# Patient Record
Sex: FEMALE | Race: WHITE | NOT HISPANIC OR LATINO | Employment: FULL TIME | URBAN - METROPOLITAN AREA
[De-identification: names, ages, dates, MRNs, and addresses within clinical notes are randomized per-mention and may not be internally consistent; named-entity substitution may affect disease eponyms.]

---

## 2017-02-21 ENCOUNTER — HOSPITAL ENCOUNTER (EMERGENCY)
Facility: MEDICAL CENTER | Age: 52
End: 2017-02-21
Attending: EMERGENCY MEDICINE
Payer: COMMERCIAL

## 2017-02-21 VITALS
HEIGHT: 65 IN | SYSTOLIC BLOOD PRESSURE: 138 MMHG | TEMPERATURE: 97.8 F | WEIGHT: 129.41 LBS | HEART RATE: 100 BPM | RESPIRATION RATE: 16 BRPM | DIASTOLIC BLOOD PRESSURE: 80 MMHG | BODY MASS INDEX: 21.56 KG/M2 | OXYGEN SATURATION: 99 %

## 2017-02-21 DIAGNOSIS — T50.905A MEDICATION SIDE EFFECT, INITIAL ENCOUNTER: ICD-10-CM

## 2017-02-21 DIAGNOSIS — R25.1 TREMOR: ICD-10-CM

## 2017-02-21 PROCEDURE — 99282 EMERGENCY DEPT VISIT SF MDM: CPT

## 2017-02-21 NOTE — ED NOTES
Pt given discharge instructions. Pt verbalized understanding. VSS no acute distress noted, pt able to ambulate without difficulty.

## 2017-02-21 NOTE — ED AVS SNAPSHOT
[employer] Access Code: [email or web address]  Expires: 3/10/2017  7:03 AM    [employer]  A secure, online tool to manage your health information     [employer]’s [employer]® is a secure, online tool that connects you to your personalized health information from the privacy of your home -- day or night - making it very easy for you to manage your healthcare. Once the activation process is completed, you can even access your medical information using the [employer] rick, which is available for free in the Apple Rick store or Google Play store.     [employer] provides the following levels of access (as shown below):   My Chart Features   [facility] Primary Care Doctor [facility]  Specialists [facility]  Urgent  Care Non-[facility]  Primary Care  Doctor   Email your healthcare team securely and privately 24/7 X X X X   Manage appointments: schedule your next appointment; view details of past/upcoming appointments X      Request prescription refills. X      View recent personal medical records, including lab and immunizations X X X X   View health record, including health history, allergies, medications X X X X   Read reports about your outpatient visits, procedures, consult and ER notes X X X X   See your discharge summary, which is a recap of your hospital and/or ER visit that includes your diagnosis, lab results, and care plan. X X       How to register for [employer]:  1. Go to  [email or web address].  2. Click on the Sign Up Now box, which takes you to the New Member Sign Up page. You will need to provide the following information:  a. Enter your [employer] Access Code exactly as it appears at the top of this page. (You will not need to use this code after you’ve completed the sign-up process. If you do not sign up before the expiration date, you must request a new code.)   b. Enter your date of birth.   c. Enter your home email address.   d. Click Submit, and follow the next screen’s instructions.  3. Create a [employer] ID. This will be your [employer]  login ID and cannot be changed, so think of one that is secure and easy to remember.  4. Create a [employer] password. You can change your password at any time.  5. Enter your Password Reset Question and Answer. This can be used at a later time if you forget your password.   6. Enter your e-mail address. This allows you to receive e-mail notifications when new information is available in [employer].  7. Click Sign Up. You can now view your health information.    For assistance activating your [employer] account, call [phone]

## 2017-02-21 NOTE — ED PROVIDER NOTES
ED Provider Note    Scribed for Harlan Davenport M.D. by Vamsi Rivas. 2/21/2017  7:45 AM    Primary care provider: Daniel Bennett M.D.  Means of arrival: Walk in   History obtained from: Patient  History limited by: None    CHIEF COMPLAINT  Chief Complaint   Patient presents with   • Tremors     since this morning       HPI  Lakshmi Tanner is a 51 y.o. female who presents to the Emergency Department complaining of tremors onset this morning. The patient reports she woke up with morning with right arm and bilateral leg tremors. She states she was placed on Paxil recently and is concerned that it is causing her tremors. She states normally drinks about 4 shots of Rum every night and has not made any recent changed to her alcohol consumption. She denies any pain, numbness, tingling, weakness. She denies any fever. She denies any pertinent past medical history.     REVIEW OF SYSTEMS  Pertinent positives include Tremors.   Pertinent negatives include no pain, numbness, tingling, weakness, fever.    E    PAST MEDICAL HISTORY   has a past medical history of Anemia.    SURGICAL HISTORY   has past surgical history that includes abdominal hysterectomy total; bladder sling female; and ankle orif.    SOCIAL HISTORY  Social History   Substance Use Topics   • Smoking status: Current Every Day Smoker -- 1.00 packs/day for 20 years     Types: Cigarettes   • Smokeless tobacco: Never Used   • Alcohol Use: No      History   Drug Use No       FAMILY HISTORY  Family History   Problem Relation Age of Onset   • Hypertension Father    • Cancer Paternal Aunt      breast   • Cancer Paternal Uncle      prostate   • Diabetes Neg Hx    • Heart Disease Neg Hx    • Hyperlipidemia Neg Hx    • Cancer Paternal Uncle      bladder       CURRENT MEDICATIONS  No current facility-administered medications for this encounter.    Current outpatient prescriptions:   •  Albuterol Sulfate 108 (90 BASE) MCG/ACT AEROSOL POWDER, BREATH ACTIVATED, Inhale  "1-2 Puffs by mouth every 6 hours as needed (cough, wheeze)., Disp: 1 Each, Rfl: 0  •  benzonatate (TESSALON) 100 MG Cap, Take 1 Cap by mouth 3 times a day as needed for Cough., Disp: 20 Cap, Rfl: 0  •  ciprofloxacin (CILOXIN) 0.3 % Solution, Place 1 gtt into affected eye[s] q3hrs (Patient not taking: Reported on 12/15/2016), Disp: 5 mL, Rfl: 0  •  erythromycin 5 MG/GM Ointment, Apply 1 ribbon of med inside lower eyelid to affected eye(s) qhs (Patient not taking: Reported on 12/15/2016), Disp: 1 Tube, Rfl: 0    ALLERGIES  No Known Allergies    PHYSICAL EXAM  VITAL SIGNS: /80 mmHg  Pulse 100  Temp(Src) 36.6 °C (97.8 °F)  Resp 16  Ht 1.651 m (5' 5\")  Wt 58.7 kg (129 lb 6.6 oz)  BMI 21.53 kg/m2  SpO2 99%    Nursing note and vitals reviewed.  Constitutional: Well-developed and well-nourished. No distress.   HENT: Head is normocephalic and atraumatic. Oropharynx is clear and moist without exudate or erythema.   Eyes: Pupils are equal, round, and reactive to light. Conjunctiva are normal.   Cardiovascular: Normal rate and regular rhythm. No murmur heard. Normal radial pulses.  Pulmonary/Chest: Breath sounds normal. No wheezes or rales.   Abdominal: Soft and non-tender. No distention    Musculoskeletal: Extremities exhibit normal range of motion without edema or tenderness.   Neurological: Awake, alert and oriented to person, place, and time. No focal deficits noted. No weakness. Slight tremor noted.   Skin: Skin is warm and dry. No rash.   Psychiatric: Normal mood and affect. Appropriate for clinical situation    DIAGNOSTIC STUDIES / PROCEDURES      RADIOLOGY  No orders to display     The radiologist's interpretation of all radiological studies have been reviewed by me.    COURSE & MEDICAL DECISION MAKING  Nursing notes, VS, PMSFHx reviewed in chart.     7:45 AM - Patient seen and examined at bedside. I discussed with patient, her signs and symptoms are most likely consistent with medication side effect. I " advised the patient to keep taking the medication and every day as prescribed until side effects subside after a week or so. I do not think this is a stroke. There is no focal deficits noted, weakness, or other neurological findings concerning stroke. Otherwise, if she is unable to tolerate, and if symptoms worsen to come back to the ED. I recommended not drinking alcohol with the medication. She will follow up with her primary care physician. Patient understood and agreed.     The patient is referred to a primary physician for blood pressure management, diabetic screening, and for all other preventative health concerns.    DISPOSITION:  Patient will be discharged home in stable condition.    FOLLOW UP:  [facility], Emergency Dept  [address] [id number] [phone]    If symptoms worsen    Daniel Bennett M.D.  [facility]  [facility] [id number] [phone]    Schedule an appointment as soon as possible for a visit        FINAL IMPRESSION  1. Tremor    2. Medication side effect, initial encounter          IVamsi (Elianaibpatrick), am scribing for, and in the presence of, Harlan Davenport M.D..    Electronically signed by: Vamsi Rivas (Vangie), 2/21/2017    IHarlan M.D. personally performed the services described in this documentation, as scribed by Vamsi Rivas in my presence, and it is both accurate and complete.    The note accurately reflects work and decisions made by me.  Harlan Davenport  2/21/2017  12:52 PM

## 2017-02-21 NOTE — ED AVS SNAPSHOT
2/21/2017          Lakshmi Tanner  [address] [place] [address]    Dear [place]:    [facility] wants to ensure your discharge home is safe and you or your loved ones have had all your questions answered regarding your care after you leave the hospital.    You may receive a telephone call within two days of your discharge.  This call is to make certain you understand your discharge instructions as well as ensure we provided you with the best care possible during your stay with us.     The call will only last approximately 3-5 minutes and will be done by a nurse.    Once again, we want to ensure your discharge home is safe and that you have a clear understanding of any next steps in your care.  If you have any questions or concerns, please do not hesitate to contact us, we are here for you.  Thank you for choosing [facility] for your healthcare needs.    Sincerely,    Alfred Boudreaux    [facility]

## 2017-02-21 NOTE — DISCHARGE INSTRUCTIONS
Paroxetine Controlled-Release Tablets  What is this medicine?  PAROXETINE (pa CANDIDO e teen) is used to treat depression. It may also be used to treat anxiety disorders, obsessive compulsive disorder, panic attacks, post traumatic stress, and premenstrual dysphoric disorder (PMDD).  This medicine may be used for other purposes; ask your health care provider or pharmacist if you have questions.  COMMON BRAND NAME(S): Paxil CR  What should I tell my health care provider before I take this medicine?  They need to know if you have any of these conditions:  -bleeding disorders  -glaucoma  -heart disease  -kidney disease  -liver disease  -low levels of sodium in the blood  -maty or bipolar disorder  -seizures  -suicidal thoughts, plans, or attempt; a previous suicide attempt by you or a family member  -take MAOIs like Carbex, Eldepryl, Marplan, Nardil, and Parnate  -take medicines that treat or prevent blood clots  -an unusual or allergic reaction to paroxetine, other medicines, foods, dyes, or preservatives  -pregnant or trying to get pregnant  -breast-feeding  How should I use this medicine?  Take this medicine by mouth with a glass of water. Follow the directions on the prescription label. You can take it with or without food. Do not crush or chew this medicine. Take your medicine at regular intervals. Do not take your medicine more often than directed. Do not stop taking this medicine suddenly except upon the advice of your doctor. Stopping this medicine too quickly may cause serious side effects or your condition may worsen.  A special MedGuide will be given to you by the pharmacist with each prescription and refill. Be sure to read this information carefully each time.  Talk to your pediatrician regarding the use of this medicine in children. Special care may be needed.  Overdosage: If you think you have taken too much of this medicine contact a poison control center or emergency room at once.  NOTE: This medicine is  only for you. Do not share this medicine with others.  What if I miss a dose?  If you miss a dose, take it as soon as you can. If it is almost time for your next dose, take only that dose. Do not take double or extra doses.  What may interact with this medicine?  Do not take this medicine with any of the following medications:  -linezolid  -MAOIs like Carbex, Eldepryl, Marplan, Nardil, and Parnate  -methylene blue (injected into a vein)  -pimozide  -thioridazine  This medicine may also interact with the following medications:  -alcohol  -antacids  -aspirin and aspirin-like medicines  -atomoxetine  -certain medicines for depression, anxiety, or psychotic disturbances  -certain medicines for irregular heart beat like propafenone, flecainide, encainide, and quinidine  -certain medicines for migraine headache like almotriptan, eletriptan, frovatriptan, naratriptan, rizatriptan, sumatriptan, zolmitriptan  -cimetidine  -digoxin  -diuretics  -fentanyl  -fosamprenavir/ritonavir  -furazolidone  -isoniazid  -lithium  -medicines that treat or prevent blood clots like warfarin, enoxaparin, and dalteparin  -medicines for sleep  -metoprolol  -NSAIDs, medicines for pain and inflammation, like ibuprofen or naproxen  -phenobarbital  -phenytoin  -procarbazine  -procyclidine  -rasagiline  -supplements like Wailua's wort, kava kava, valerian  -tamoxifen  -theophylline  -tramadol  -tryptophan  This list may not describe all possible interactions. Give your health care provider a list of all the medicines, herbs, non-prescription drugs, or dietary supplements you use. Also tell them if you smoke, drink alcohol, or use illegal drugs. Some items may interact with your medicine.  What should I watch for while using this medicine?  Tell your doctor if your symptoms do not get better or if they get worse. Visit your doctor or health care professional for regular checks on your progress. Because it may take several weeks to see the full  effects of this medicine, it is important to continue your treatment as prescribed by your doctor.  Patients and their families should watch out for new or worsening thoughts of suicide or depression. Also watch out for sudden changes in feelings such as feeling anxious, agitated, panicky, irritable, hostile, aggressive, impulsive, severely restless, overly excited and hyperactive, or not being able to sleep. If this happens, especially at the beginning of treatment or after a change in dose, call your health care professional.  You may get drowsy or dizzy. Do not drive, use machinery, or do anything that needs mental alertness until you know how this medicine affects you. Do not stand or sit up quickly, especially if you are an older patient. This reduces the risk of dizzy or fainting spells. Alcohol may interfere with the effect of this medicine. Avoid alcoholic drinks.  Your mouth may get dry. Chewing sugarless gum or sucking hard candy, and drinking plenty of water will help. Contact your doctor if the problem does not go away or is severe.  What side effects may I notice from receiving this medicine?  Side effects that you should report to your doctor or health care professional as soon as possible:  -allergic reactions like skin rash, itching or hives, swelling of the face, lips, or tongue  -black or bloody stools, blood in the urine or vomit  -fast, irregular heartbeat  -hallucination, loss of contact with reality  -painful or prolonged erection (men)  -seizures  -suicidal thoughts or other mood changes  -trouble passing urine or change in the amount of urine  -unusual bleeding or bruising  -unusually weak or tired  -vomiting  Side effects that usually do not require medical attention (report to your doctor or health care professional if they continue or are bothersome):  -change in appetite, weight  -change in sex drive or performance  -constipation or diarrhea  -difficulty  sleeping  -drowsy  -headache  -increased sweating  -muscle pain or weakness  -tremors  This list may not describe all possible side effects. Call your doctor for medical advice about side effects. You may report side effects to FDA at 5-110-RZC-9257.  Where should I keep my medicine?  Keep out of the reach of children.  Store at or below 25 degrees C (77 degrees F). Throw away any unused medicine after the expiration date.  NOTE: This sheet is a summary. It may not cover all possible information. If you have questions about this medicine, talk to your doctor, pharmacist, or health care provider.  © 2014, [employer]. (8/1/2013 5:51:56 PM)

## 2017-02-21 NOTE — ED NOTES
Pt ambulatory to triage c/o tremors to right arm and bilateral legs since this morning. Pt states that she was placed on Paxil last Friday and is concerned that it is causing her tremors. Pt states that she normally drink about 4 shots of Rum every night and has not made any recent changes to her alcohol consumption.

## 2017-02-21 NOTE — ED AVS SNAPSHOT
Home Care Instructions                                                                                                                Lakshmi Tanner   MRN: [id number]    Department:  [facility], Emergency Dept   Date of Visit:  2/21/2017            [facility], Emergency Dept    [address] [facility] [address]    Phone:  [phone]      You were seen by     Harlan Davenport M.D.      Your Diagnosis Was     Tremor     R25.1       Follow-up Information     1. Follow up with [facility], Emergency Dept.    Specialty:  Emergency Medicine    Why:  If symptoms worsen    Contact information    [address] [facility] [address] [phone]        2. Schedule an appointment as soon as possible for a visit with Daniel Bennett M.D..    Specialty:  Family Medicine    Contact information    [address] [place] [address] [address]  [facility] [address] [phone]        Medication Information     Review all of your home medications and newly ordered medications with your primary doctor and/or pharmacist as soon as possible. Follow medication instructions as directed by your doctor and/or pharmacist.     Please keep your complete medication list with you and share with your physician. Update the information when medications are discontinued, doses are changed, or new medications (including over-the-counter products) are added; and carry medication information at all times in the event of emergency situations.               Medication List      ASK your doctor about these medications        Instructions    Albuterol Sulfate 108 (90 BASE) MCG/ACT Aepb    Inhale 1-2 Puffs by mouth every 6 hours as needed (cough, wheeze).   Dose:  1-2 Puff       benzonatate 100 MG Caps   Commonly known as:  TESSALON    Take 1 Cap by mouth 3 times a day as needed for Cough.   Dose:  100 mg       ciprofloxacin 0.3 % Soln   Commonly known as:  CILOXIN    Place 1 gtt into affected  eye[s] q3hrs       erythromycin 5 MG/GM Oint    Apply 1 ribbon of med inside lower eyelid to affected eye(s) qhs                 Discharge Instructions       Paroxetine Controlled-Release Tablets  What is this medicine?  PAROXETINE (pa CANDIDO e teen) is used to treat depression. It may also be used to treat anxiety disorders, obsessive compulsive disorder, panic attacks, post traumatic stress, and premenstrual dysphoric disorder (PMDD).  This medicine may be used for other purposes; ask your health care provider or pharmacist if you have questions.  COMMON BRAND NAME(S): Paxil CR  What should I tell my health care provider before I take this medicine?  They need to know if you have any of these conditions:  -bleeding disorders  -glaucoma  -heart disease  -kidney disease  -liver disease  -low levels of sodium in the blood  -maty or bipolar disorder  -seizures  -suicidal thoughts, plans, or attempt; a previous suicide attempt by you or a family member  -take MAOIs like Carbex, Eldepryl, Marplan, Nardil, and Parnate  -take medicines that treat or prevent blood clots  -an unusual or allergic reaction to paroxetine, other medicines, foods, dyes, or preservatives  -pregnant or trying to get pregnant  -breast-feeding  How should I use this medicine?  Take this medicine by mouth with a glass of water. Follow the directions on the prescription label. You can take it with or without food. Do not crush or chew this medicine. Take your medicine at regular intervals. Do not take your medicine more often than directed. Do not stop taking this medicine suddenly except upon the advice of your doctor. Stopping this medicine too quickly may cause serious side effects or your condition may worsen.  A special MedGuide will be given to you by the pharmacist with each prescription and refill. Be sure to read this information carefully each time.  Talk to your pediatrician regarding the use of this medicine in children. Special care may be  needed.  Overdosage: If you think you have taken too much of this medicine contact a poison control center or emergency room at once.  NOTE: This medicine is only for you. Do not share this medicine with others.  What if I miss a dose?  If you miss a dose, take it as soon as you can. If it is almost time for your next dose, take only that dose. Do not take double or extra doses.  What may interact with this medicine?  Do not take this medicine with any of the following medications:  -linezolid  -MAOIs like Carbex, Eldepryl, Marplan, Nardil, and Parnate  -methylene blue (injected into a vein)  -pimozide  -thioridazine  This medicine may also interact with the following medications:  -alcohol  -antacids  -aspirin and aspirin-like medicines  -atomoxetine  -certain medicines for depression, anxiety, or psychotic disturbances  -certain medicines for irregular heart beat like propafenone, flecainide, encainide, and quinidine  -certain medicines for migraine headache like almotriptan, eletriptan, frovatriptan, naratriptan, rizatriptan, sumatriptan, zolmitriptan  -cimetidine  -digoxin  -diuretics  -fentanyl  -fosamprenavir/ritonavir  -furazolidone  -isoniazid  -lithium  -medicines that treat or prevent blood clots like warfarin, enoxaparin, and dalteparin  -medicines for sleep  -metoprolol  -NSAIDs, medicines for pain and inflammation, like ibuprofen or naproxen  -phenobarbital  -phenytoin  -procarbazine  -procyclidine  -rasagiline  -supplements like Jackie's wort, kava kava, valerian  -tamoxifen  -theophylline  -tramadol  -tryptophan  This list may not describe all possible interactions. Give your health care provider a list of all the medicines, herbs, non-prescription drugs, or dietary supplements you use. Also tell them if you smoke, drink alcohol, or use illegal drugs. Some items may interact with your medicine.  What should I watch for while using this medicine?  Tell your doctor if your symptoms do not get better  or if they get worse. Visit your doctor or health care professional for regular checks on your progress. Because it may take several weeks to see the full effects of this medicine, it is important to continue your treatment as prescribed by your doctor.  Patients and their families should watch out for new or worsening thoughts of suicide or depression. Also watch out for sudden changes in feelings such as feeling anxious, agitated, panicky, irritable, hostile, aggressive, impulsive, severely restless, overly excited and hyperactive, or not being able to sleep. If this happens, especially at the beginning of treatment or after a change in dose, call your health care professional.  You may get drowsy or dizzy. Do not drive, use machinery, or do anything that needs mental alertness until you know how this medicine affects you. Do not stand or sit up quickly, especially if you are an older patient. This reduces the risk of dizzy or fainting spells. Alcohol may interfere with the effect of this medicine. Avoid alcoholic drinks.  Your mouth may get dry. Chewing sugarless gum or sucking hard candy, and drinking plenty of water will help. Contact your doctor if the problem does not go away or is severe.  What side effects may I notice from receiving this medicine?  Side effects that you should report to your doctor or health care professional as soon as possible:  -allergic reactions like skin rash, itching or hives, swelling of the face, lips, or tongue  -black or bloody stools, blood in the urine or vomit  -fast, irregular heartbeat  -hallucination, loss of contact with reality  -painful or prolonged erection (men)  -seizures  -suicidal thoughts or other mood changes  -trouble passing urine or change in the amount of urine  -unusual bleeding or bruising  -unusually weak or tired  -vomiting  Side effects that usually do not require medical attention (report to your doctor or health care professional if they continue or  are bothersome):  -change in appetite, weight  -change in sex drive or performance  -constipation or diarrhea  -difficulty sleeping  -drowsy  -headache  -increased sweating  -muscle pain or weakness  -tremors  This list may not describe all possible side effects. Call your doctor for medical advice about side effects. You may report side effects to FDA at 6-379-BKN-5715.  Where should I keep my medicine?  Keep out of the reach of children.  Store at or below 25 degrees C (77 degrees F). Throw away any unused medicine after the expiration date.  NOTE: This sheet is a summary. It may not cover all possible information. If you have questions about this medicine, talk to your doctor, pharmacist, or health care provider.  © 2014, [employer]/Gold Standard. (8/1/2013 5:51:56 PM)            Patient Information     Patient Information    Following emergency treatment: all patient requiring follow-up care must return either to a private physician or a clinic if your condition worsens before you are able to obtain further medical attention, please return to the emergency room.     Billing Information    At [facility], we work to make the billing process streamlined for our patients.  Our Representatives are here to answer any questions you may have regarding your hospital bill.  If you have insurance coverage and have supplied your insurance information to us, we will submit a claim to your insurer on your behalf.  Should you have any questions regarding your bill, we can be reached online or by phone as follows:  Online: You are able pay your bills online or live chat with our representatives about any billing questions you may have. We are here to help Monday - Friday from 8:00am to 7:30pm and 9:00am - 12:00pm on Saturdays.  Please visit [email or web address]  for more information.   Phone:  [phone] or [phone]    Please note that your emergency physician, surgeon, pathologist,  radiologist, anesthesiologist, and other specialists are not employed by [facility] and will therefore bill separately for their services.  Please contact them directly for any questions concerning their bills at the numbers below:     Emergency Physician Services:  [phone]  [facility]:  [phone]  [facility]:  [phone]  [facility]:  [phone]    1. Your final bill may vary from the amount quoted upon discharge if all procedures are not complete at that time, or if your doctor has additional procedures of which we are not aware. You will receive an additional bill if you return to the Emergency Department at [facility] for suture removal regardless of the facility of which the sutures were placed.     2. Please arrange for settlement of this account at the emergency registration.    3. All self-pay accounts are due in full at the time of treatment.  If you are unable to meet this obligation then payment is expected within 4-5 days.     4. If you have had radiology studies (CT, X-ray, Ultrasound, MRI), you have received a preliminary result during your emergency department visit. Please contact the radiology department [phone] to receive a copy of your final result. Please discuss the Final result with your primary physician or with the follow up physician provided.     Crisis Hotline:  [place] Crisis Hotline:  6-656-SLQPTLB or 1-666.110.6116  [place] Crisis Hotline:    1-733.567.4464 or 915-582-3811         ED Discharge Follow Up Questions    1. In order to provide you with very good care, we would like to follow up with a phone call in the next few days.  May we have your permission to contact you?     YES /  NO    2. What is the best phone number to call you? (       )_____-__________    3. What is the best time to call you?      Morning  /  Afternoon  /  Evening                   Patient Signature:   ____________________________________________________________    Date:  ____________________________________________________________      Your appointments     Mar 09, 2017  1:40 PM   [facility] [place] [facility]    [address] [place] [address], [address]  [facility] [id number] [phone]           You will be receiving a confirmation call a few days before your appointment from our automated call confirmation system.

## 2017-09-18 ENCOUNTER — OFFICE VISIT (OUTPATIENT)
Dept: URGENT CARE | Facility: CLINIC | Age: 52
End: 2017-09-18
Payer: COMMERCIAL

## 2017-09-18 ENCOUNTER — APPOINTMENT (OUTPATIENT)
Dept: RADIOLOGY | Facility: MEDICAL CENTER | Age: 52
End: 2017-09-18
Attending: EMERGENCY MEDICINE
Payer: COMMERCIAL

## 2017-09-18 ENCOUNTER — HOSPITAL ENCOUNTER (OUTPATIENT)
Facility: MEDICAL CENTER | Age: 52
End: 2017-09-19
Attending: EMERGENCY MEDICINE | Admitting: HOSPITALIST
Payer: COMMERCIAL

## 2017-09-18 ENCOUNTER — RESOLUTE PROFESSIONAL BILLING HOSPITAL PROF FEE (OUTPATIENT)
Dept: HOSPITALIST | Facility: MEDICAL CENTER | Age: 52
End: 2017-09-18
Payer: COMMERCIAL

## 2017-09-18 ENCOUNTER — APPOINTMENT (OUTPATIENT)
Dept: RADIOLOGY | Facility: MEDICAL CENTER | Age: 52
End: 2017-09-18
Attending: HOSPITALIST
Payer: COMMERCIAL

## 2017-09-18 VITALS
HEIGHT: 65 IN | OXYGEN SATURATION: 96 % | TEMPERATURE: 97.9 F | HEART RATE: 89 BPM | DIASTOLIC BLOOD PRESSURE: 72 MMHG | BODY MASS INDEX: 22.33 KG/M2 | RESPIRATION RATE: 14 BRPM | SYSTOLIC BLOOD PRESSURE: 126 MMHG | WEIGHT: 134 LBS

## 2017-09-18 DIAGNOSIS — R20.2 PARESTHESIA: ICD-10-CM

## 2017-09-18 DIAGNOSIS — R20.2 NUMBNESS AND TINGLING OF RIGHT UPPER AND LOWER EXTREMITY: ICD-10-CM

## 2017-09-18 DIAGNOSIS — R20.0 NUMBNESS AND TINGLING OF RIGHT UPPER AND LOWER EXTREMITY: ICD-10-CM

## 2017-09-18 DIAGNOSIS — N30.00 ACUTE CYSTITIS WITHOUT HEMATURIA: ICD-10-CM

## 2017-09-18 PROBLEM — G81.91 RIGHT HEMIPARESIS (HCC): Status: ACTIVE | Noted: 2017-09-18

## 2017-09-18 LAB
ALBUMIN SERPL BCP-MCNC: 4.6 G/DL (ref 3.2–4.9)
ALBUMIN/GLOB SERPL: 1.5 G/DL
ALP SERPL-CCNC: 46 U/L (ref 30–99)
ALT SERPL-CCNC: 22 U/L (ref 2–50)
ANION GAP SERPL CALC-SCNC: 9 MMOL/L (ref 0–11.9)
APPEARANCE UR: CLEAR
APTT PPP: 26.2 SEC (ref 24.7–36)
AST SERPL-CCNC: 32 U/L (ref 12–45)
BACTERIA #/AREA URNS HPF: ABNORMAL /HPF
BASOPHILS # BLD AUTO: 1.8 % (ref 0–1.8)
BASOPHILS # BLD: 0.09 K/UL (ref 0–0.12)
BILIRUB SERPL-MCNC: 0.4 MG/DL (ref 0.1–1.5)
BILIRUB UR QL STRIP.AUTO: NEGATIVE
BUN SERPL-MCNC: 7 MG/DL (ref 8–22)
CALCIUM SERPL-MCNC: 9.7 MG/DL (ref 8.5–10.5)
CHLORIDE SERPL-SCNC: 105 MMOL/L (ref 96–112)
CO2 SERPL-SCNC: 24 MMOL/L (ref 20–33)
COLOR UR: YELLOW
CREAT SERPL-MCNC: 0.79 MG/DL (ref 0.5–1.4)
CULTURE IF INDICATED INDCX: YES UA CULTURE
EKG IMPRESSION: NORMAL
EOSINOPHIL # BLD AUTO: 0.11 K/UL (ref 0–0.51)
EOSINOPHIL NFR BLD: 2.2 % (ref 0–6.9)
EPI CELLS #/AREA URNS HPF: ABNORMAL /HPF
ERYTHROCYTE [DISTWIDTH] IN BLOOD BY AUTOMATED COUNT: 46.8 FL (ref 35.9–50)
EST. AVERAGE GLUCOSE BLD GHB EST-MCNC: 105 MG/DL
GFR SERPL CREATININE-BSD FRML MDRD: >60 ML/MIN/1.73 M 2
GLOBULIN SER CALC-MCNC: 3 G/DL (ref 1.9–3.5)
GLUCOSE SERPL-MCNC: 73 MG/DL (ref 65–99)
GLUCOSE UR STRIP.AUTO-MCNC: NEGATIVE MG/DL
HBA1C MFR BLD: 5.3 % (ref 0–5.6)
HCT VFR BLD AUTO: 46.5 % (ref 37–47)
HGB BLD-MCNC: 16.6 G/DL (ref 12–16)
HYALINE CASTS #/AREA URNS LPF: ABNORMAL /LPF
IMM GRANULOCYTES # BLD AUTO: 0.01 K/UL (ref 0–0.11)
IMM GRANULOCYTES NFR BLD AUTO: 0.2 % (ref 0–0.9)
INR PPP: 0.85 (ref 0.87–1.13)
KETONES UR STRIP.AUTO-MCNC: NEGATIVE MG/DL
LEUKOCYTE ESTERASE UR QL STRIP.AUTO: NEGATIVE
LV EJECT FRACT  99904: 70
LV EJECT FRACT MOD 2C 99903: 80.31
LV EJECT FRACT MOD 4C 99902: 70.34
LV EJECT FRACT MOD BP 99901: 73.73
LYMPHOCYTES # BLD AUTO: 1.76 K/UL (ref 1–4.8)
LYMPHOCYTES NFR BLD: 35.3 % (ref 22–41)
MCH RBC QN AUTO: 34 PG (ref 27–33)
MCHC RBC AUTO-ENTMCNC: 35.7 G/DL (ref 33.6–35)
MCV RBC AUTO: 95.3 FL (ref 81.4–97.8)
MICRO URNS: ABNORMAL
MONOCYTES # BLD AUTO: 0.32 K/UL (ref 0–0.85)
MONOCYTES NFR BLD AUTO: 6.4 % (ref 0–13.4)
NEUTROPHILS # BLD AUTO: 2.69 K/UL (ref 2–7.15)
NEUTROPHILS NFR BLD: 54.1 % (ref 44–72)
NITRITE UR QL STRIP.AUTO: POSITIVE
NRBC # BLD AUTO: 0 K/UL
NRBC BLD AUTO-RTO: 0 /100 WBC
PH UR STRIP.AUTO: 5 [PH]
PLATELET # BLD AUTO: 164 K/UL (ref 164–446)
PMV BLD AUTO: 10 FL (ref 9–12.9)
POTASSIUM SERPL-SCNC: 4.4 MMOL/L (ref 3.6–5.5)
PROT SERPL-MCNC: 7.6 G/DL (ref 6–8.2)
PROT UR QL STRIP: NEGATIVE MG/DL
PROTHROMBIN TIME: 11.9 SEC (ref 12–14.6)
RBC # BLD AUTO: 4.88 M/UL (ref 4.2–5.4)
RBC # URNS HPF: ABNORMAL /HPF
RBC UR QL AUTO: NEGATIVE
SODIUM SERPL-SCNC: 138 MMOL/L (ref 135–145)
SP GR UR STRIP.AUTO: 1
TSH SERPL DL<=0.005 MIU/L-ACNC: 2.75 UIU/ML (ref 0.3–3.7)
UROBILINOGEN UR STRIP.AUTO-MCNC: 0.2 MG/DL
WBC # BLD AUTO: 5 K/UL (ref 4.8–10.8)
WBC #/AREA URNS HPF: ABNORMAL /HPF

## 2017-09-18 PROCEDURE — 700111 HCHG RX REV CODE 636 W/ 250 OVERRIDE (IP): Performed by: EMERGENCY MEDICINE

## 2017-09-18 PROCEDURE — 99214 OFFICE O/P EST MOD 30 MIN: CPT | Performed by: NURSE PRACTITIONER

## 2017-09-18 PROCEDURE — 85610 PROTHROMBIN TIME: CPT

## 2017-09-18 PROCEDURE — 96375 TX/PRO/DX INJ NEW DRUG ADDON: CPT

## 2017-09-18 PROCEDURE — 93880 EXTRACRANIAL BILAT STUDY: CPT

## 2017-09-18 PROCEDURE — 84443 ASSAY THYROID STIM HORMONE: CPT

## 2017-09-18 PROCEDURE — 81001 URINALYSIS AUTO W/SCOPE: CPT

## 2017-09-18 PROCEDURE — 87086 URINE CULTURE/COLONY COUNT: CPT

## 2017-09-18 PROCEDURE — 93880 EXTRACRANIAL BILAT STUDY: CPT | Mod: 26 | Performed by: SURGERY

## 2017-09-18 PROCEDURE — 99220 PR INITIAL OBSERVATION CARE,LEVL III: CPT | Performed by: HOSPITALIST

## 2017-09-18 PROCEDURE — 93306 TTE W/DOPPLER COMPLETE: CPT

## 2017-09-18 PROCEDURE — 93005 ELECTROCARDIOGRAM TRACING: CPT | Performed by: EMERGENCY MEDICINE

## 2017-09-18 PROCEDURE — 80053 COMPREHEN METABOLIC PANEL: CPT

## 2017-09-18 PROCEDURE — 85730 THROMBOPLASTIN TIME PARTIAL: CPT

## 2017-09-18 PROCEDURE — 70551 MRI BRAIN STEM W/O DYE: CPT

## 2017-09-18 PROCEDURE — 71010 DX-CHEST-PORTABLE (1 VIEW): CPT

## 2017-09-18 PROCEDURE — G0378 HOSPITAL OBSERVATION PER HR: HCPCS

## 2017-09-18 PROCEDURE — 93005 ELECTROCARDIOGRAM TRACING: CPT

## 2017-09-18 PROCEDURE — 700102 HCHG RX REV CODE 250 W/ 637 OVERRIDE(OP): Performed by: HOSPITALIST

## 2017-09-18 PROCEDURE — 96374 THER/PROPH/DIAG INJ IV PUSH: CPT

## 2017-09-18 PROCEDURE — A9270 NON-COVERED ITEM OR SERVICE: HCPCS | Performed by: HOSPITALIST

## 2017-09-18 PROCEDURE — 85025 COMPLETE CBC W/AUTO DIFF WBC: CPT

## 2017-09-18 PROCEDURE — 36415 COLL VENOUS BLD VENIPUNCTURE: CPT

## 2017-09-18 PROCEDURE — 306484 SLEEVE,VASO THIGH MED: Performed by: HOSPITALIST

## 2017-09-18 PROCEDURE — 83036 HEMOGLOBIN GLYCOSYLATED A1C: CPT

## 2017-09-18 PROCEDURE — 99285 EMERGENCY DEPT VISIT HI MDM: CPT

## 2017-09-18 PROCEDURE — 87077 CULTURE AEROBIC IDENTIFY: CPT

## 2017-09-18 PROCEDURE — 93306 TTE W/DOPPLER COMPLETE: CPT | Mod: 26 | Performed by: INTERNAL MEDICINE

## 2017-09-18 PROCEDURE — 87186 SC STD MICRODIL/AGAR DIL: CPT

## 2017-09-18 PROCEDURE — 70450 CT HEAD/BRAIN W/O DYE: CPT

## 2017-09-18 RX ORDER — ASPIRIN 81 MG/1
324 TABLET, CHEWABLE ORAL DAILY
Status: DISCONTINUED | OUTPATIENT
Start: 2017-09-18 | End: 2017-09-19 | Stop reason: HOSPADM

## 2017-09-18 RX ORDER — ACETAMINOPHEN 325 MG/1
650 TABLET ORAL EVERY 6 HOURS PRN
Status: DISCONTINUED | OUTPATIENT
Start: 2017-09-18 | End: 2017-09-19 | Stop reason: HOSPADM

## 2017-09-18 RX ORDER — CEFTRIAXONE 1 G/1
1 INJECTION, POWDER, FOR SOLUTION INTRAMUSCULAR; INTRAVENOUS ONCE
Status: COMPLETED | OUTPATIENT
Start: 2017-09-18 | End: 2017-09-18

## 2017-09-18 RX ORDER — TEMAZEPAM 15 MG/1
15 CAPSULE ORAL
Status: DISCONTINUED | OUTPATIENT
Start: 2017-09-18 | End: 2017-09-19 | Stop reason: HOSPADM

## 2017-09-18 RX ORDER — ONDANSETRON 2 MG/ML
4 INJECTION INTRAMUSCULAR; INTRAVENOUS EVERY 4 HOURS PRN
Status: DISCONTINUED | OUTPATIENT
Start: 2017-09-18 | End: 2017-09-19 | Stop reason: HOSPADM

## 2017-09-18 RX ORDER — BISACODYL 10 MG
10 SUPPOSITORY, RECTAL RECTAL
Status: DISCONTINUED | OUTPATIENT
Start: 2017-09-18 | End: 2017-09-19 | Stop reason: HOSPADM

## 2017-09-18 RX ORDER — ASPIRIN 325 MG
325 TABLET ORAL DAILY
Status: DISCONTINUED | OUTPATIENT
Start: 2017-09-18 | End: 2017-09-19 | Stop reason: HOSPADM

## 2017-09-18 RX ORDER — PROMETHAZINE HYDROCHLORIDE 25 MG/1
12.5-25 SUPPOSITORY RECTAL EVERY 4 HOURS PRN
Status: DISCONTINUED | OUTPATIENT
Start: 2017-09-18 | End: 2017-09-19 | Stop reason: HOSPADM

## 2017-09-18 RX ORDER — AMOXICILLIN 250 MG
2 CAPSULE ORAL 2 TIMES DAILY
Status: DISCONTINUED | OUTPATIENT
Start: 2017-09-19 | End: 2017-09-19 | Stop reason: HOSPADM

## 2017-09-18 RX ORDER — LABETALOL HYDROCHLORIDE 5 MG/ML
10 INJECTION, SOLUTION INTRAVENOUS EVERY 4 HOURS PRN
Status: DISCONTINUED | OUTPATIENT
Start: 2017-09-18 | End: 2017-09-19 | Stop reason: HOSPADM

## 2017-09-18 RX ORDER — PROMETHAZINE HYDROCHLORIDE 25 MG/1
12.5-25 TABLET ORAL EVERY 4 HOURS PRN
Status: DISCONTINUED | OUTPATIENT
Start: 2017-09-18 | End: 2017-09-19 | Stop reason: HOSPADM

## 2017-09-18 RX ORDER — ONDANSETRON 4 MG/1
4 TABLET, ORALLY DISINTEGRATING ORAL EVERY 4 HOURS PRN
Status: DISCONTINUED | OUTPATIENT
Start: 2017-09-18 | End: 2017-09-19 | Stop reason: HOSPADM

## 2017-09-18 RX ORDER — ASPIRIN 300 MG/1
300 SUPPOSITORY RECTAL DAILY
Status: DISCONTINUED | OUTPATIENT
Start: 2017-09-18 | End: 2017-09-19 | Stop reason: HOSPADM

## 2017-09-18 RX ORDER — ATORVASTATIN CALCIUM 80 MG/1
80 TABLET, FILM COATED ORAL EVERY EVENING
Status: DISCONTINUED | OUTPATIENT
Start: 2017-09-18 | End: 2017-09-19 | Stop reason: HOSPADM

## 2017-09-18 RX ORDER — POLYETHYLENE GLYCOL 3350 17 G/17G
1 POWDER, FOR SOLUTION ORAL
Status: DISCONTINUED | OUTPATIENT
Start: 2017-09-18 | End: 2017-09-19 | Stop reason: HOSPADM

## 2017-09-18 RX ADMIN — CEFTRIAXONE SODIUM 1 G: 1 INJECTION, POWDER, FOR SOLUTION INTRAMUSCULAR; INTRAVENOUS at 11:42

## 2017-09-18 RX ADMIN — ATORVASTATIN CALCIUM 80 MG: 80 TABLET, FILM COATED ORAL at 21:23

## 2017-09-18 ASSESSMENT — LIFESTYLE VARIABLES
CONSUMPTION TOTAL: POSITIVE
EVER FELT BAD OR GUILTY ABOUT YOUR DRINKING: NO
TOTAL SCORE: 0
EVER HAD A DRINK FIRST THING IN THE MORNING TO STEADY YOUR NERVES TO GET RID OF A HANGOVER: NO
HOW MANY TIMES IN THE PAST YEAR HAVE YOU HAD 5 OR MORE DRINKS IN A DAY: 1
ON A TYPICAL DAY WHEN YOU DRINK ALCOHOL HOW MANY DRINKS DO YOU HAVE: 1
TOTAL SCORE: 0
HAVE PEOPLE ANNOYED YOU BY CRITICIZING YOUR DRINKING: NO
AVERAGE NUMBER OF DAYS PER WEEK YOU HAVE A DRINK CONTAINING ALCOHOL: 2
HAVE YOU EVER FELT YOU SHOULD CUT DOWN ON YOUR DRINKING: NO
EVER_SMOKED: YES
TOTAL SCORE: 0
DO YOU DRINK ALCOHOL: YES

## 2017-09-18 ASSESSMENT — COPD QUESTIONNAIRES
DO YOU EVER COUGH UP ANY MUCUS OR PHLEGM?: NO/ONLY WITH OCCASIONAL COLDS OR INFECTIONS
HAVE YOU SMOKED AT LEAST 100 CIGARETTES IN YOUR ENTIRE LIFE: YES
DURING THE PAST 4 WEEKS HOW MUCH DID YOU FEEL SHORT OF BREATH: NONE/LITTLE OF THE TIME
COPD SCREENING SCORE: 3

## 2017-09-18 ASSESSMENT — PATIENT HEALTH QUESTIONNAIRE - PHQ9
1. LITTLE INTEREST OR PLEASURE IN DOING THINGS: NOT AT ALL
SUM OF ALL RESPONSES TO PHQ QUESTIONS 1-9: 0
SUM OF ALL RESPONSES TO PHQ9 QUESTIONS 1 AND 2: 0
2. FEELING DOWN, DEPRESSED, IRRITABLE, OR HOPELESS: NOT AT ALL

## 2017-09-18 ASSESSMENT — PAIN SCALES - GENERAL
PAINLEVEL_OUTOF10: 0

## 2017-09-18 NOTE — H&P
PRIMARY CARE:  None.    CHIEF COMPLAINT:  Right arm and right leg numbness and weakness.    HISTORY OF PRESENT ILLNESS:  This is a 52-year-old female who presents with   the above symptoms.  She reports that she woke this morning and was feeling   fine.  While she was driving to work, she started to notice some heaviness and   numbness in her right arm and right leg.  When she got out to drive to work   she could do it, but she felt like the right side of her body was just not   working like it is supposed to.  She did not notice any problems with her   speech, there was no facial droop that she is aware of or any co-worker is   aware of.  She did notice some difficulty with coordination on that side.    Patient came to the emergency room and was evaluated.  Her NIH score was too   low to make her TPA candidate, though she did present within the window.    Since the time of her onset of symptoms, she reports that she is now doing   much better.  She states her symptoms are not entirely back to normal;   however, she thinks that they are much better than they were at onset.  She   continues to have a little bit of numbness and she thinks may be a little bit   of weakness in the right side generally.    REVIEW OF SYSTEMS:  Positive as noted above, otherwise all systems are   reviewed and negative.    PREVIOUS MEDICAL HISTORY:  Patient denies any previous medical history.    MEDICATIONS:  The patient states she takes no prescribed medications.    ALLERGIES:  No known drug allergies.    SOCIAL HISTORY:  Patient smokes about a pack and a half and she has been doing   that for most of her life.  She states she will drink 2-3 times a week,   usually when her daughter comes over to visit.  She works at a [employer].    SURGICAL HISTORY:  1.  Total abdominal hysterectomy.  2.  Bladder sling.  3.  Ankle ORIF.    FAMILY HISTORY:  Father with atrial fibrillation, she states otherwise no   significant family  history.    PHYSICAL EXAMINATION:  VITAL SIGNS:  Temperature 97.1, heart rate 68, respiratory rate 16, /78,   satting 99% on room air.  GENERAL:  The patient is awake, alert.  She is in no acute distress.  HEENT:  Head is normocephalic and atraumatic.  Mucous membranes are moist.    Sclerae and conjunctivae benign.  NECK:  Trachea is midline.  There is no JVD or bruits.  Neck is supple.  RESPIRATORY:  Clear to auscultation bilaterally.  CARDIAC:  Regular rate and rhythm, sinus on the monitor.  No murmurs, rubs, or   clicks.  ABDOMEN:  Soft.  No guarding, rebound, hepatosplenomegaly, or masses.    Nontender to palpation.  EXTREMITIES:  No clubbing, cyanosis, or edema.  Peripheral pulses are   maintained.  Calves are nontender to palpation.  SKIN:  Warm and dry.  No rashes are appreciated.  NEUROLOGIC:  The patient is alert and oriented.  Her speech is clear and   content is logical.  Gait is normal.  Romberg is negative.  Finger-to-nose and   heel-to-shin are normal and symmetric.  Sensation is intact throughout.    Cranial nerves II through XII are intact.  Strength is symmetric bilaterally,   which I would interpret to be slightly diminished on her dominant right side.    LABORATORY DATA:  White count 5.0, hemoglobin 16.6, platelet count 164.    Sodium 138, potassium 4.4, BUN 7, creatinine 0.79.  LFTs are unremarkable.    INR 0.85.  UA:  Nitrite positive, leukocyte esterase negative.    IMAGING STUDIES:  1.  CT of the head is interpreted by radiology and demonstrates no acute   findings.  2.  Portable chest x-ray reviewed by myself.  Slightly hyperinflated,   otherwise unremarkable.  3.  EKG is reviewed.  There is no comparison study.  It demonstrates a sinus   rhythm with a rate of 84.  ST segments are isoelectric throughout.  T-waves   are unremarkable with exception of slight inversion in aVL.  There are no   significant Q-waves.  Intervals are benign as are voltages.  Impression:    Unremarkable  EKG.    ASSESSMENT AND PLAN:  A 52-year-old female with problem list as follows:  1.  Right hemiparesis:  Initial workup is negative, but presentation is   concerning for cerebrovascular accident.  Patient will be admitted to the   neuro floor where we will continue workup with cardiac echo, telemetry   monitoring, carotid ultrasounds, and MRI of the brain.  We will check lipids   and maintain her on aspirin as she was on no antiplatelet therapy prior to   this.  I have encouraged her to quit smoking.  2.  Urinary tract infection:  We will treat with Rocephin.  This is seemingly   uncomplicated UTI.  Therefore, 3 days should be sufficient.  3.  Tobacco abuse:  I have counseled cessation.    It is anticipated the patient will likely be able to go home in less than 2   midnights assuming that her initial stroke workup is relatively unremarkable.    Treatment plan has been reviewed with the patient at length and all questions   have been answered.       ____________________________________     DO MANISHA Hodges / PAOLA    DD:  09/18/2017 12:09:13  DT:  09/18/2017 13:07:21    D#:  [id number]  Job#:  [id number]

## 2017-09-18 NOTE — ED PROVIDER NOTES
ED Provider Note    CHIEF COMPLAINT  Chief Complaint   Patient presents with   • Numbness     R arm and leg - started today       HPI  Lakshmi Tanner is a 52 y.o. female who presents to the emergency room with right arm and right leg numbness and weakness. Symptoms started today at about 7 AM when she was driving to work. She noticed that she was having a hard time stepping on the gas pedal with her right foot. She felt tingling in her entire right leg and in her right arm. She denies having associated headache. No slurred speech or confusion. No poor coordination. Her symptoms have improved and she no longer feels any weakness. She still feels some tingling in the right leg. This is complicated because of a past history of neuropathy. Etiology of her neuropathy is not clear. She's not had a fever or chills. No chest pain shortness of breath abdominal pain.    REVIEW OF SYSTEMS  As per HPI All other systems reviewed and are negative.     PAST MEDICAL HISTORY  Anemia, neuropathy    FAMILY HISTORY  Family History   Problem Relation Age of Onset   • Hypertension Father    • Cancer Paternal Aunt      breast   • Cancer Paternal Uncle      prostate   • Diabetes Neg Hx    • Heart Disease Neg Hx    • Hyperlipidemia Neg Hx    • Cancer Paternal Uncle      bladder       SOCIAL HISTORY  Social History   Substance Use Topics   • Smoking status: Current Every Day Smoker     Packs/day: 1.00     Years: 20.00     Types: Cigarettes   • Smokeless tobacco: Never Used   • Alcohol use No       SURGICAL HISTORY  Past Surgical History:   Procedure Laterality Date   • ABDOMINAL HYSTERECTOMY TOTAL     • ANKLE ORIF     • BLADDER SLING FEMALE         CURRENT MEDICATIONS  Home Medications     Reviewed by Edna Nielson (Pharmacy Tech) on 09/18/17 at 1041  Med List Status: Complete   Medication Last Dose Status        Patient Pancho Taking any Medications                       ALLERGIES  No Known Allergies    PHYSICAL EXAM  VITAL  "SIGNS: /78   Pulse 68   Temp 37.1 °C (98.7 °F)   Resp 16   Ht 1.651 m (5' 5\")   Wt 60 kg (132 lb 4.4 oz)   SpO2 95%   BMI 22.01 kg/m²   Constitutional: Awake and alert  HENT: Head atraumatic, ears normal inspection, oropharynx is benign with moist mucous membranes, nares clear  Eyes: Pupils equal round reactive, extraocular muscles are intact. No ptosis or miosis.  Neck: Neck is supple without meningismus. No JVD. No pain. No bruit is noted  Cardiovascular: Normal heart rate, Normal rhythm  Thorax & Lungs: Normal breath sounds, No respiratory distress, No wheezing, No chest tenderness.   Abdomen: Soft, No tenderness, No masses, No pulsatile masses.   Skin: No pathologic rash  Extremities: Intact distal pulses, No edema, No tenderness, No cyanosis, No clubbing.   Neurologic: Sensory disturbance in bilateral hands and bilateral feet. This is unchanged from baseline aside from possibly some increased tingling in the right leg diffusely. Sensory is intact, but abnormal. Her cranial nerves are intact. Speech clear. Awake and alert. Motor 5/5 throughout. Normal sensory. Normal extraocular muscles. No visual field cut. No neglect. NIH 1-1 point for acute sensory disturbance right leg      RADIOLOGY/PROCEDURES  DX-CHEST-PORTABLE (1 VIEW)   Final Result      No evidence of acute cardiopulmonary process.      CT-HEAD W/O   Final Result      No evidence of acute intracranial process.         Imaging is interpreted by radiologist    LABS:  Results for orders placed or performed during the hospital encounter of 09/18/17   CBC WITH DIFFERENTIAL   Result Value Ref Range    WBC 5.0 4.8 - 10.8 K/uL    RBC 4.88 4.20 - 5.40 M/uL    Hemoglobin 16.6 (H) 12.0 - 16.0 g/dL    Hematocrit 46.5 37.0 - 47.0 %    MCV 95.3 81.4 - 97.8 fL    MCH 34.0 (H) 27.0 - 33.0 pg    MCHC 35.7 (H) 33.6 - 35.0 g/dL    RDW 46.8 35.9 - 50.0 fL    Platelet Count 164 164 - 446 K/uL    MPV 10.0 9.0 - 12.9 fL    Neutrophils-Polys 54.10 44.00 - 72.00 % "    Lymphocytes 35.30 22.00 - 41.00 %    Monocytes 6.40 0.00 - 13.40 %    Eosinophils 2.20 0.00 - 6.90 %    Basophils 1.80 0.00 - 1.80 %    Immature Granulocytes 0.20 0.00 - 0.90 %    Nucleated RBC 0.00 /100 WBC    Neutrophils (Absolute) 2.69 2.00 - 7.15 K/uL    Lymphs (Absolute) 1.76 1.00 - 4.80 K/uL    Monos (Absolute) 0.32 0.00 - 0.85 K/uL    Eos (Absolute) 0.11 0.00 - 0.51 K/uL    Baso (Absolute) 0.09 0.00 - 0.12 K/uL    Immature Granulocytes (abs) 0.01 0.00 - 0.11 K/uL    NRBC (Absolute) 0.00 K/uL   COMP METABOLIC PANEL   Result Value Ref Range    Sodium 138 135 - 145 mmol/L    Potassium 4.4 3.6 - 5.5 mmol/L    Chloride 105 96 - 112 mmol/L    Co2 24 20 - 33 mmol/L    Anion Gap 9.0 0.0 - 11.9    Glucose 73 65 - 99 mg/dL    Bun 7 (L) 8 - 22 mg/dL    Creatinine 0.79 0.50 - 1.40 mg/dL    Calcium 9.7 8.5 - 10.5 mg/dL    AST(SGOT) 32 12 - 45 U/L    ALT(SGPT) 22 2 - 50 U/L    Alkaline Phosphatase 46 30 - 99 U/L    Total Bilirubin 0.4 0.1 - 1.5 mg/dL    Albumin 4.6 3.2 - 4.9 g/dL    Total Protein 7.6 6.0 - 8.2 g/dL    Globulin 3.0 1.9 - 3.5 g/dL    A-G Ratio 1.5 g/dL   APTT   Result Value Ref Range    APTT 26.2 24.7 - 36.0 sec   PROTHROMBIN TIME   Result Value Ref Range    PT 11.9 (L) 12.0 - 14.6 sec    INR 0.85 (L) 0.87 - 1.13   URINALYSIS CULTURE, IF INDICATED   Result Value Ref Range    Color Yellow     Character Clear     Specific Gravity 1.003 <1.035    Ph 5.0 5.0 - 8.0    Glucose Negative Negative mg/dL    Ketones Negative Negative mg/dL    Protein Negative Negative mg/dL    Bilirubin Negative Negative    Urobilinogen, Urine 0.2 Negative    Nitrite Positive (A) Negative    Leukocyte Esterase Negative Negative    Occult Blood Negative Negative    Micro Urine Req Microscopic     Culture Indicated Yes UA Culture   ESTIMATED GFR   Result Value Ref Range    GFR If African American >60 >60 mL/min/1.73 m 2    GFR If Non African American >60 >60 mL/min/1.73 m 2   URINE MICROSCOPIC (W/UA)   Result Value Ref Range    WBC  0-2 /hpf    RBC 0-2 /hpf    Bacteria Many (A) None /hpf    Epithelial Cells Few /hpf    Hyaline Cast 0-2 /lpf   EKG (ER)   Result Value Ref Range    Report       [facility] Emergency Dept.    Test Date:  2017  Pt Name:    SATINDER VALERO              Department: ER  MRN:        [id number]                      Room:       [facility]  Gender:     F                            Technician: [id number]  :        1965                   Requested By:ER TRIAGE PROTOCOL  Order #:    [id number]                    Reading MD: Lizzeth    Measurements  Intervals                                Axis  Rate:       84                           P:          74  AK:         140                          QRS:        68  QRSD:       84                           T:          70  QT:         392  QTc:        464    Interpretive Statements  SINUS RHYTHM  No previous ECG available for comparison           COURSE & MEDICAL DECISION MAKING  Patient presents with Paresthesias in her right arm and right leg. These are improved, but still some mild paresthesias in the right lower extremity. Her NIH is 1. Given her symptoms and findings on exam I do not believe alteplase is indicated (risk outweighs benefit). Workup was initiated. CT scan of the head was negative. Laboratory data returned showing UTI patient was given Rocephin intravenously. Patient will be admitted to the hospital for further workup of her paresthesias and to rule out CVA. She is kept NPO. At this point she is stable. Dr. Yun was consulted for admission.    FINAL IMPRESSION  1. Right-sided paresthesias    This dictation was created using voice recognition software. The accuracy of the dictation is limited to the abilities of the software. I expect there may be some errors of grammar and possibly content. The nursing notes were reviewed and certain aspects of this information were incorporated into this note.      Electronically signed by: Dalton Moreau,  9/18/2017 11:47 AM

## 2017-09-18 NOTE — PROGRESS NOTES
Pt arrived to unit via gurney from main ER.  Patient ambulated from gurney to bed, gait steady.   Pt oriented to unit, call light.

## 2017-09-18 NOTE — PROGRESS NOTES
Provided education regarding to stroke including s/s, FAST, tx, risk factors, medications, diet, permissive HTN & exercise. Pt verbalized understanding. All questions are answered.  Provided stroke education book and extra information papers.    Incentive spirometer provided to patient, educated on proper use, pt able to demonstrate proper use.  Sequentials placed on patient, educated on importance, pt verbalizes understanding.

## 2017-09-18 NOTE — PROGRESS NOTES
"Admit profile and assessment completed.  Pt's weight obtained on standup scale.  Pt refused pneumonia and influenza vaccine.    Pt A&Ox4, bilat  strong and equal, no drift.  No facial droop or slurred speech.  Pt states, \"numbness is gone, now I have some tingling\".  Pt reports tingling to right arm and right leg.  5/5 muscle strength to bilat upper and lower extremities.  Respirations even, unlabored on room air. Pt denies any pain at this time.  Monitors applied, sinus rhythm noted.    Bed in locked, lowest position.  Call light and personal belongings within reach.  Pt updated on POC, updated communication board.  Needs met, will continue to monitor.      "

## 2017-09-18 NOTE — ED NOTES
"Chief Complaint   Patient presents with   • Numbness     R arm and leg - started today     Hx neuropathy in hands and feet, states she has never felt it in arm or leg. No facial droop, equal  bilaterally, pt speaking in full, clear sentences.      /78   Pulse (!) 105   Temp 37.1 °C (98.7 °F)   Resp 16   Ht 1.651 m (5' 5\")   Wt 60 kg (132 lb 4.4 oz)   SpO2 99%   BMI 22.01 kg/m²       Pt Informed regarding triage process and verbalized understanding to inform triage tech or RN for any changes in condition.  Placed in lobby.    "

## 2017-09-19 VITALS
DIASTOLIC BLOOD PRESSURE: 61 MMHG | BODY MASS INDEX: 21.63 KG/M2 | SYSTOLIC BLOOD PRESSURE: 121 MMHG | WEIGHT: 129.85 LBS | OXYGEN SATURATION: 97 % | HEIGHT: 65 IN | HEART RATE: 64 BPM | RESPIRATION RATE: 18 BRPM | TEMPERATURE: 97.3 F

## 2017-09-19 LAB
CHOLEST SERPL-MCNC: 194 MG/DL (ref 100–199)
HDLC SERPL-MCNC: 77 MG/DL
LDLC SERPL CALC-MCNC: 99 MG/DL
TRIGL SERPL-MCNC: 88 MG/DL (ref 0–149)

## 2017-09-19 PROCEDURE — 700111 HCHG RX REV CODE 636 W/ 250 OVERRIDE (IP): Performed by: HOSPITALIST

## 2017-09-19 PROCEDURE — 99217 PR OBSERVATION CARE DISCHARGE: CPT | Performed by: HOSPITALIST

## 2017-09-19 PROCEDURE — 36415 COLL VENOUS BLD VENIPUNCTURE: CPT

## 2017-09-19 PROCEDURE — 700105 HCHG RX REV CODE 258: Performed by: HOSPITALIST

## 2017-09-19 PROCEDURE — 96365 THER/PROPH/DIAG IV INF INIT: CPT

## 2017-09-19 PROCEDURE — 80061 LIPID PANEL: CPT

## 2017-09-19 PROCEDURE — G0378 HOSPITAL OBSERVATION PER HR: HCPCS

## 2017-09-19 RX ORDER — ATORVASTATIN CALCIUM 80 MG/1
40 TABLET, FILM COATED ORAL EVERY EVENING
Qty: 30 TAB | Refills: 2 | Status: SHIPPED | OUTPATIENT
Start: 2017-09-19 | End: 2017-09-19

## 2017-09-19 RX ORDER — ATORVASTATIN CALCIUM 40 MG/1
40 TABLET, FILM COATED ORAL EVERY EVENING
Qty: 30 TAB | Refills: 2 | Status: SHIPPED | OUTPATIENT
Start: 2017-09-19 | End: 2017-10-20 | Stop reason: SDUPTHER

## 2017-09-19 RX ADMIN — CEFTRIAXONE SODIUM 1 G: 1 INJECTION, POWDER, FOR SOLUTION INTRAMUSCULAR; INTRAVENOUS at 08:25

## 2017-09-19 ASSESSMENT — PAIN SCALES - GENERAL: PAINLEVEL_OUTOF10: 0

## 2017-09-19 NOTE — DISCHARGE INSTRUCTIONS
Discharge Instructions    Discharged to home by car with relative. Discharged via wheelchair, hospital escort: Yes.  Special equipment needed: Not Applicable    Be sure to schedule a follow-up appointment with your primary care doctor or any specialists as instructed.     Discharge Plan:   Diet Plan: Discussed  Activity Level: Discussed  Smoking Cessation Offered: Patient Refused  Confirmed Follow up Appointment: Patient to Call and Schedule Appointment  Confirmed Symptoms Management: Discussed  Medication Reconciliation Updated: Yes  Influenza Vaccine Indication: Patient Refuses    I understand that a diet low in cholesterol, fat, and sodium is recommended for good health. Unless I have been given specific instructions below for another diet, I accept this instruction as my diet prescription.   Other diet: Heart Healthy    Special Instructions: None    · Is patient discharged on Warfarin / Coumadin?   No     · Is patient Post Blood Transfusion?  No    Depression / Suicide Risk    As you are discharged from this [facility] facility, it is important to learn how to keep safe from harming yourself.    Recognize the warning signs:  · Abrupt changes in personality, positive or negative- including increase in energy   · Giving away possessions  · Change in eating patterns- significant weight changes-  positive or negative  · Change in sleeping patterns- unable to sleep or sleeping all the time   · Unwillingness or inability to communicate  · Depression  · Unusual sadness, discouragement and loneliness  · Talk of wanting to die  · Neglect of personal appearance   · Rebelliousness- reckless behavior  · Withdrawal from people/activities they love  · Confusion- inability to concentrate     If you or a loved one observes any of these behaviors or has concerns about self-harm, here's what you can do:  · Talk about it- your feelings and reasons for harming yourself  · Remove any means that you might use to hurt yourself  "(examples: pills, rope, extension cords, firearm)  · Get professional help from the community (Mental Health, Substance Abuse, psychological counseling)  · Do not be alone:Call your Safe Contact- someone whom you trust who will be there for you.  · Call your local CRISIS HOTLINE 085-0968 or 252-331-3439  · Call your local Children's Mobile Crisis Response Team [place] (872) 451-2633 or www.[employer]  · Call the toll free National Suicide Prevention Hotlines   · National Suicide Prevention Lifeline 015-505-GFDM (7465)  · [employer] Line [employer] 800-SUICIDE (884-0259)      Transient Ischemic Attack  A transient ischemic attack (TIA) is a \"warning stroke\" that causes stroke-like symptoms. A TIA does not cause lasting damage to the brain. The symptoms of a TIA can happen fast and do not last long. It is important to know the symptoms of a TIA and what to do. This can help prevent stroke or death.   HOME CARE   · Take medicines only as told by your doctor. Make sure you understand all of the instructions.  · You may need to take aspirin or warfarin medicine. Warfarin needs to be taken exactly as told.  ¨ Taking too much or too little warfarin is dangerous. Blood tests must be done as often as told by your doctor. A PT blood test measures how long it takes for blood to clot. Your PT is used to calculate another value called an INR. Your PT and INR help your doctor adjust your warfarin dosage. He or she will make sure you are taking the right amount.  ¨ Food can cause problems with warfarin and affect the results of your blood tests. This is true for foods high in vitamin K. Eat the same amount of foods high in vitamin K each day. Foods high in vitamin K include spinach, kale, broccoli, cabbage, edmond and turnip greens, [place] sprouts, peas, cauliflower, seaweed, and parsley. Other foods high in vitamin K include beef and pork liver, green tea, and soybean oil. Eat the same amount of foods high in " vitamin K each day. Avoid big changes in your diet. Tell your doctor before changing your diet. Talk to a  (dietitian) if you have questions.  ¨ Many medicines can cause problems with warfarin and affect your PT and INR. Tell your doctor about all medicines you take. This includes vitamins and dietary pills (supplements). Do not take or stop taking any prescribed or over-the-counter medicines unless your doctor tells you to.  ¨ Warfarin can cause more bruising or bleeding. Hold pressure over any cuts for longer than normal. Talk to your doctor about other side effects of warfarin.  ¨ Avoid sports or activities that may cause injury or bleeding.  ¨ Be careful when you shave, floss, or use sharp objects.  ¨ Avoid or drink very little alcohol while taking warfarin. Tell your doctor if you change how much alcohol you drink.  ¨ Tell your dentist and other doctors that you take warfarin before any procedures.  · Follow your diet program as told, if you are given one.  · Keep a healthy weight.  · Stay active. Try to get at least 30 minutes of activity on all or most days.  · Do not use any tobacco products, including cigarettes, chewing tobacco, or electronic cigarettes. If you need help quitting, ask your doctor.  · Limit alcohol intake to no more than 1 drink per day for nonpregnant women and 2 drinks per day for men. One drink equals 12 ounces of beer, 5 ounces of wine, or 1½ ounces of hard liquor.  · Do not abuse drugs.  · Keep your home safe so you do not fall. You can do this by:  ¨ Putting grab bars in the bedroom and bathroom.  ¨ Raising toilet seats.  ¨ Putting a seat in the shower.  · Keep all follow-up visits as told by your doctor. This is important.  GET HELP IF:  · Your personality changes.  · You have trouble swallowing.  · You have double vision.  · You are dizzy.  · You have a fever.  GET HELP RIGHT AWAY IF:   These symptoms may be an emergency. Do not wait to see if the symptoms will go  away. Get medical help right away. Call your local emergency services (911 in the [place] Do not drive yourself to the hospital.  · You have sudden weakness or lose feeling (go numb), especially on one side of the body. This can affect your:  ¨ Face.  ¨ Arm.  ¨ Leg.  · You have sudden trouble walking.  · You have sudden trouble moving your arms or legs.  · You have sudden confusion.  · You have trouble talking.  · You have trouble understanding.  · You have sudden trouble seeing in one or both eyes.  · You lose your balance.  · Your movements are not smooth.  · You have a sudden, very bad headache with no known cause.  · You have new chest pain.  · Your heartbeat is unsteady.  · You are partly or totally unaware of what is going on around you.  MAKE SURE YOU:   · Understand these instructions.  · Will watch your condition.  · Will get help right away if you are not doing well or get worse.     This information is not intended to replace advice given to you by your health care provider. Make sure you discuss any questions you have with your health care provider.     Document Released: 09/26/2009 Document Revised: 01/08/2016 Document Reviewed: 03/25/2015  [employer] Interactive Patient Education ©2016 [employer]

## 2017-09-19 NOTE — PROGRESS NOTES
A/o,assessment completed per CDU,poc discussed,verbalized understanding,tolerating po well,denies numbness,still has some tingling rle,call button within reach,son at bedside,will continue to monitor.

## 2017-09-19 NOTE — DISCHARGE SUMMARY
Hospital Medicine Discharge Note     Admit Date:  9/18/2017       Discharge Date:   9/19/2017    Attending Physician:  Niraj Murphy     Diagnoses (includes active and resolved):   Active Problems:    Right hemiparesis (CMS-HCC) POA: Unknown  Resolved Problems:    * No resolved hospital problems. *      Hospital Summary (Brief Narrative):       52-year-old female p/w R hemiparesis.  Her NIH score was too low to make her TPA candidate, though she did present within the window.    Since the time of her onset of symptoms, she reports that she is now doing   much better.   She was admitted for stroke workup. The pt had an unremarkable brain CT. The pt was monitored on telemetry with neuro checks. The pt also had imaging including a negative carotid US and echocardiogram. The pt was reassured and discharged home on aspirin and statin.   She has returned to her previous level of functionality and thus does not need PT/OT.      Consultants:      None    Procedures:        None    Discharge Medications:           Medication List      START taking these medications      Instructions   aspirin EC 81 MG Tbec  Commonly known as:  ECOTRIN   Take 1 Tab by mouth every day.  Dose:  81 mg     atorvastatin 40 MG Tabs  Commonly known as:  LIPITOR   Take 1 Tab by mouth every evening.  Dose:  40 mg          Disposition:   Discharge home    Diet:   Regular    Activity:   As tolerated    Code status:   Full code    Primary Care Provider:    Daniel Bennett M.D.    Follow up appointment details :      PCP in 2 weeks  No follow-up provider specified.  Future Appointments  Date Time Provider Department Center   9/27/2017 3:40 PM Siri Baumann M.D. [facility]   1/17/2018 4:00 PM Fior Montanez D.O. [facility]       Pending Studies:        None    Time spent on discharge day patient visit: 41 minutes    #################################################    Interval history/exam for day of discharge:    Vitals:    09/18/17 2354 09/19/17  0437 09/19/17 0751 09/19/17 1149   BP: 131/69 135/80 131/71 121/61   Pulse:  63 75 64   Resp: 16 16 16 18   Temp: 36.1 °C (97 °F) 36.8 °C (98.2 °F) 36.3 °C (97.4 °F) 36.3 °C (97.3 °F)   SpO2: 97% 98% 100% 97%   Weight:       Height:         Weight/BMI: Body mass index is 21.61 kg/m².  Pulse Oximetry: 97 %, O2 (LPM): 0, O2 Delivery: None (Room Air)    General:  NAD  CVS:  RRR  PULM:  CTAB, no respiratory distress    Most Recent Labs:    Lab Results   Component Value Date/Time    WBC 5.0 09/18/2017 09:45 AM    RBC 4.88 09/18/2017 09:45 AM    HEMOGLOBIN 16.6 (H) 09/18/2017 09:45 AM    HEMATOCRIT 46.5 09/18/2017 09:45 AM    MCV 95.3 09/18/2017 09:45 AM    MCH 34.0 (H) 09/18/2017 09:45 AM    MCHC 35.7 (H) 09/18/2017 09:45 AM    MPV 10.0 09/18/2017 09:45 AM    NEUTSPOLYS 54.10 09/18/2017 09:45 AM    LYMPHOCYTES 35.30 09/18/2017 09:45 AM    MONOCYTES 6.40 09/18/2017 09:45 AM    EOSINOPHILS 2.20 09/18/2017 09:45 AM    BASOPHILS 1.80 09/18/2017 09:45 AM    HYPOCHROMIA 3+ 01/26/2007 08:30 AM    ANISOCYTOSIS 2+ 01/26/2007 08:30 AM      Lab Results   Component Value Date/Time    SODIUM 138 09/18/2017 09:45 AM    POTASSIUM 4.4 09/18/2017 09:45 AM    CHLORIDE 105 09/18/2017 09:45 AM    CO2 24 09/18/2017 09:45 AM    GLUCOSE 73 09/18/2017 09:45 AM    BUN 7 (L) 09/18/2017 09:45 AM    CREATININE 0.79 09/18/2017 09:45 AM    BUNCREATRAT 9 07/25/2015 12:08 PM      Lab Results   Component Value Date/Time    ALTSGPT 22 09/18/2017 09:45 AM    ASTSGOT 32 09/18/2017 09:45 AM    ALKPHOSPHAT 46 09/18/2017 09:45 AM    TBILIRUBIN 0.4 09/18/2017 09:45 AM    ALBUMIN 4.6 09/18/2017 09:45 AM    GLOBULIN 3.0 09/18/2017 09:45 AM    INR 0.85 (L) 09/18/2017 09:45 AM    MACROCYTOSIS 1+ 01/26/2007 08:30 AM     Lab Results   Component Value Date/Time    PROTHROMBTM 11.9 (L) 09/18/2017 09:45 AM    INR 0.85 (L) 09/18/2017 09:45 AM        Imaging/ Testing:      MR-BRAIN-W/O   Final Result      MRI OF THE BRAIN WITHOUT CONTRAST WITHIN NORMAL LIMITS.       Carotid Duplex ([facility] [place] [facility] Only) - Do not order if CTA - Neck done in ER   Final Result      Echocardiogram Comp W/O cont   Final Result      DX-CHEST-PORTABLE (1 VIEW)   Final Result      No evidence of acute cardiopulmonary process.      CT-HEAD W/O   Final Result      No evidence of acute intracranial process.          Instructions:      The patient was instructed to return to the ER in the event of worsening symptoms. I have counseled the patient on the importance of compliance and the patient has agreed to proceed with all medical recommendations and follow up plan indicated above.   The patient understands that all medications come with benefits and risks. Risks may include permanent injury or death and these risks can be minimized with close reassessment and monitoring.

## 2017-09-19 NOTE — PROGRESS NOTES
Bedside report received 0715. POC discussed with pt; Pt denies numbness and tingling at this time; All symptoms have subsided; Neuro intact; No overnight events; Pending MRI results; all questions answered at this time.

## 2017-09-19 NOTE — DISCHARGE PLANNING
Discussed with bedside RN,  pt assessed per RN discharge needs.  Pt has support at home and verbalized discharge plans to bedside RN.  No needs identified at this time.  Will continue to follow for any further needs. Discussed f/u with pt and she stated that her current PCP had retired.  The  was contacted and an appt with a PCP was set up prior to dc.

## 2017-09-19 NOTE — PROGRESS NOTES
Pt dc'd home. IV and monitor removed; Pt left unit via walking with , Refused hospital escort. Personal belongings with pt when leaving unit. Pt given discharge instructions prior to leaving unit including prescription and when to visit with physician; verbalizes understanding. Copy of discharge instructions with pt and in the chart.

## 2017-09-19 NOTE — PROGRESS NOTES
During rounds POC was discussed about pt. Pt up ambulatory, no unsteadiness, pt not requiring assistance. Discussion with Hospitalist about therapies ordered, pt not requiring therapy eval due to not requiring assistance as reported by RNs.

## 2017-09-19 NOTE — PROGRESS NOTES
Report received from Jennifer RN. Tele monitoring in place. Pt sleeping, no s/s of distress, unlabored breathing. Bed in low position, call light within reach.

## 2017-09-20 ENCOUNTER — PATIENT OUTREACH (OUTPATIENT)
Dept: HEALTH INFORMATION MANAGEMENT | Facility: OTHER | Age: 52
End: 2017-09-20

## 2017-09-20 LAB
BACTERIA UR CULT: ABNORMAL
BACTERIA UR CULT: ABNORMAL
SIGNIFICANT IND 70042: ABNORMAL
SITE SITE: ABNORMAL
SOURCE SOURCE: ABNORMAL

## 2017-09-20 NOTE — PROGRESS NOTES
"09/20/2017  1609 - Discharge Outreach - received call from patient - asking when she is released to gp back to work. States her job is extremely stressful right now and doesn't feel comfortable going back to work yet. Has appt with Dr. Baumann on 9/27 - advised to talk with Dr. Baumann on 9/27 about returning to work. States she has been having \"halos\" in vision - states she saw opthomologist who told her she had early stages of cataracts. Patient wants reassurance that that is what is causing it. Advised to discuss with eye doctor and Dr. Baumann.  "

## 2017-09-27 ENCOUNTER — OFFICE VISIT (OUTPATIENT)
Dept: MEDICAL GROUP | Facility: CLINIC | Age: 52
End: 2017-09-27
Payer: COMMERCIAL

## 2017-09-27 VITALS
DIASTOLIC BLOOD PRESSURE: 72 MMHG | RESPIRATION RATE: 16 BRPM | OXYGEN SATURATION: 97 % | SYSTOLIC BLOOD PRESSURE: 120 MMHG | HEIGHT: 65 IN | TEMPERATURE: 99.2 F | BODY MASS INDEX: 21.66 KG/M2 | WEIGHT: 130 LBS | HEART RATE: 100 BPM

## 2017-09-27 DIAGNOSIS — F17.200 CURRENT SMOKER: ICD-10-CM

## 2017-09-27 DIAGNOSIS — Z09 HOSPITAL DISCHARGE FOLLOW-UP: ICD-10-CM

## 2017-09-27 DIAGNOSIS — G45.8 OTHER SPECIFIED TRANSIENT CEREBRAL ISCHEMIAS: ICD-10-CM

## 2017-09-27 PROBLEM — G45.9 TRANSIENT CEREBRAL ISCHEMIA: Status: ACTIVE | Noted: 2017-09-27

## 2017-09-27 PROCEDURE — 99215 OFFICE O/P EST HI 40 MIN: CPT | Performed by: FAMILY MEDICINE

## 2017-09-27 ASSESSMENT — PATIENT HEALTH QUESTIONNAIRE - PHQ9: CLINICAL INTERPRETATION OF PHQ2 SCORE: 0

## 2017-09-27 NOTE — PROGRESS NOTES
Subjective:     Lakshmi Tanner is a 52 y.o. female who presents for Hospital Follow-up.  Chart and discharge summary reviewed.   Date of discharge 9/19/17.      HPI: Recently hospitalized for numbness and weakness of right upper and lower extremity. She was admitted for stroke workup. CT scan and MRI were unremarkable as were carotid Doppler studies and echocardiogram. She was placed on baby aspirin and Lipitor.    She has a 2 pack-a-day smoker. She is motivated to quit.    Since returning home, patient reports feeling tired. She says her arms and legs feel heavy.     The patient has questions regarding hospitalization or discharge: All questions answered.  The patient has a little weakness; denies difficulty taking care of self at home.  Patient reports taking medications as instructed.      Allergies:   Review of patient's allergies indicates no known allergies.    Social History:  Social History   Substance Use Topics   • Smoking status: Current Every Day Smoker     Packs/day: 2.00     Years: 40.00     Types: Cigarettes   • Smokeless tobacco: Never Used   • Alcohol use 1.2 oz/week     2 Shots of liquor per week      Comment: 14 shots of Rum a week        ROS:    Constitutional:  Denies fever, chills, night sweats, fatigue or malaise  HENT: Denies head congestion, ear pain or drainage, decreased hearing, sore throat  Eyes: Denies visual changes, eye drainage or redness, eye pain  Neck: Denies pain, swollen glands, decreased range of motion  Lungs: Denies shortness of breath, wheezing, cough  Cardiovascular: Denies chest pain, orthopnea, lower extremity edema, palpitations  Abdominal: Denies abdominal pain, change in bowel or bladder habits, nausea or vomiting  Musculoskeletal: Denies back pain, joint pains, decreased range of motion  Endocrine: Denies unexplained weight changes, heat or cold intolerance, hair loss, polyuria or polydipsia  Neurological: Denies dizziness, headache, confusion, focal weakness or  "numbness, memory loss  Psychiatric: Denies depression, anxiety, insomnia       Objective:     Blood pressure 120/72, pulse 100, temperature 37.3 °C (99.2 °F), resp. rate 16, height 1.651 m (5' 5\"), weight 59 kg (130 lb), SpO2 97 %.     Physical Exam:    GEN:  Alert, oriented, in no distress  HEENT:  PERRLA, EOMI  NECK;  Supple without adenopathy or thyromegaly.    LUNGS:  Clear to auscultation without rales, rhonci, or wheezes.  CV:  Heart RRR without murmurs or S3 or S4  EXT:  Without cyanosis, clubbing, or edema.  NEURO:  Cranial nerves II through XII intact.  Motor function and sensation intact. Strength 5 out of 5 bilaterally upper and lower extremity. Heel to toe walking is normal.  SKIN: No rashes or suspicious lesions.  PSYCH:  Behavior is appropriate.      Assessment and Plan:     1. Hospital follow-up:   Hospitalization and results reviewed with patient. High risk conditions requiring teaching or care coordination were identified and addressed.The patient demonstrate understanding of admission and underlying conditions. The patient understands discharge instructions and when to seek medical attention. Medications reviewed including instructions regarding high risk medications, dosing and side effects.    The patient is able to safely adhere to ADL/IADL, treatment and medication regimen, self-manage of high-risk conditions? Yes   The patient requires physical therapy/home health/DME referral? No   The patient requires referral to care coordination/behavioral health/social work?  No   Patient requires referral for pharmacy consult? No     2. Other specified transient cerebral ischemias  -I discussed with her that her current symptoms of extremity heaviness are probably not due to TIA. It is possible that it is a side effect of Lipitor. She will hold off on it for a few days and then start back at half dose.    3. Current smoker    - REFERRAL TO TOBACCO CESSATION PROGRAM        Medication " Reconciliation  Medication list at end of encounter:   Current Outpatient Prescriptions   Medication Sig Dispense Refill   • aspirin EC (ECOTRIN) 81 MG Tablet Delayed Response Take 1 Tab by mouth every day. 365 Tab 0   • atorvastatin (LIPITOR) 40 MG Tab Take 1 Tab by mouth every evening. 30 Tab 2     No current facility-administered medications for this visit.        Primary care follow-up:  New health conditions identified during hospitalization? Yes   Changes to medications during hospitalization or today? Yes     Recommended followup:  Will establish with new PCP  Appointment made with Rondey Light for next month.    Patient Instruction  Patient provided with educational material on discharge diagnosis and management of symptoms/red flags. Patient instructed to keep follow-up appointments and to bring written questions and and actual medications to each office visit. Patient instructed to call PCP/specialist with any problems/questions/concerns. Patient verbalizes understanding and has no further questions at this time.    Total of 40 minutes face-to-face time was spent with patient, with greater than 50% of the time spent in counseling and coordination of care.

## 2017-10-12 ENCOUNTER — TELEPHONE (OUTPATIENT)
Dept: MEDICAL GROUP | Facility: CLINIC | Age: 52
End: 2017-10-12

## 2017-10-12 NOTE — TELEPHONE ENCOUNTER
Patient dropped off forms last week saying that she gave Dr Baumann the wrong forms. Dr Baumann completed the new release to work form (scanned in to Media) and called patient's home number ([phone] (home). Her spouse, Harlan, answered. Since there is no indication that I can discuss treatment /info with spouse, I asked for a message to be passed that Sherrell from Dr Baumann's office called & requested call back.

## 2017-10-20 ENCOUNTER — OFFICE VISIT (OUTPATIENT)
Dept: MEDICAL GROUP | Facility: MEDICAL CENTER | Age: 52
End: 2017-10-20
Payer: COMMERCIAL

## 2017-10-20 VITALS
OXYGEN SATURATION: 97 % | SYSTOLIC BLOOD PRESSURE: 112 MMHG | DIASTOLIC BLOOD PRESSURE: 66 MMHG | TEMPERATURE: 97.6 F | HEIGHT: 65 IN | WEIGHT: 130 LBS | RESPIRATION RATE: 16 BRPM | BODY MASS INDEX: 21.66 KG/M2 | HEART RATE: 102 BPM

## 2017-10-20 DIAGNOSIS — G81.91 RIGHT HEMIPARESIS (HCC): ICD-10-CM

## 2017-10-20 DIAGNOSIS — Z72.0 TOBACCO ABUSE: ICD-10-CM

## 2017-10-20 DIAGNOSIS — Z12.39 SCREENING FOR BREAST CANCER: ICD-10-CM

## 2017-10-20 DIAGNOSIS — G45.8 OTHER SPECIFIED TRANSIENT CEREBRAL ISCHEMIAS: ICD-10-CM

## 2017-10-20 PROCEDURE — 99214 OFFICE O/P EST MOD 30 MIN: CPT | Performed by: NURSE PRACTITIONER

## 2017-10-20 RX ORDER — ATORVASTATIN CALCIUM 40 MG/1
40 TABLET, FILM COATED ORAL EVERY EVENING
Qty: 90 TAB | Refills: 2 | Status: SHIPPED | OUTPATIENT
Start: 2017-10-20 | End: 2019-01-09

## 2017-10-20 NOTE — PROGRESS NOTES
Subjective:      Lakshmi Tanner is a 52 y.o. female who presents with New Patient            HPI Lakshmi Tanner Is here today to establish care post hospital for questionable TIA.      1. Right hemiparesis (CMS-HCC)  Patient went to the emergency room on 9/18/17 with complaints of tingling and numbness of the right side of her body. She had workup including CAT scan of the brain, MRI of the brain, carotid Doppler studies and echocardiogram but no cause for symptoms was found. She was considered high risk for stroke because of her smoking and age and therefore was started on Lipitor and aspirin. She has had no repeat of symptoms.    2. Other specified transient cerebral ischemias  As mentioned above, patient was felt to possibly have had TIA without evidence of CVA or MI. She currently still smokes 2 packs of cigarettes a day but is taking her Lipitor and aspirin. She reports mild bruising probably due to aspirin. She denies myalgias on her Lipitor.    3. Tobacco abuse  Patient states her insurance will pay for medication to quit smoking and would like to try Chantix.      Social History   Substance Use Topics   • Smoking status: Current Every Day Smoker     Packs/day: 2.00     Years: 40.00     Types: Cigarettes   • Smokeless tobacco: Never Used   • Alcohol use 1.2 oz/week     2 Shots of liquor per week      Comment: 14 shots of Rum a week     Current Outpatient Prescriptions   Medication Sig Dispense Refill   • varenicline (CHANTIX STARTING MONTH PAK) 0.5 MG X 11 & 1 MG X 42 tablet Followed by continuing dose pack 56 Tab 3   • atorvastatin (LIPITOR) 40 MG Tab Take 1 Tab by mouth every evening. 90 Tab 2   • aspirin EC (ECOTRIN) 81 MG Tablet Delayed Response Take 1 Tab by mouth every day. 365 Tab 0     No current facility-administered medications for this visit.      Family History   Problem Relation Age of Onset   • Hypertension Father    • Cancer Paternal Aunt      breast   • Cancer Paternal Uncle       "prostate   • Cancer Paternal Uncle      bladder   • Diabetes Neg Hx    • Heart Disease Neg Hx    • Hyperlipidemia Neg Hx      Past Medical History:   Diagnosis Date   • Anemia        Review of Systems   All other systems reviewed and are negative.         Objective:     /66   Pulse (!) 102   Temp 36.4 °C (97.6 °F)   Resp 16   Ht 1.651 m (5' 5\")   Wt 59 kg (130 lb)   SpO2 97%   BMI 21.63 kg/m²      Physical Exam   Constitutional: She is oriented to person, place, and time. She appears well-developed and well-nourished. No distress.   HENT:   Head: Normocephalic and atraumatic.   Right Ear: External ear normal.   Left Ear: External ear normal.   Nose: Nose normal.   Eyes: Right eye exhibits no discharge. Left eye exhibits no discharge.   Neck: Normal range of motion. Neck supple. No thyromegaly present.   Cardiovascular: Normal rate, regular rhythm and normal heart sounds.  Exam reveals no gallop and no friction rub.    No murmur heard.  Pulmonary/Chest: Effort normal and breath sounds normal. She has no wheezes. She has no rales.   Musculoskeletal: She exhibits no edema or tenderness.   Neurological: She is alert and oriented to person, place, and time. She displays normal reflexes.   Skin: Skin is warm and dry. No rash noted. She is not diaphoretic.   Psychiatric: She has a normal mood and affect. Her behavior is normal. Judgment and thought content normal.   Nursing note and vitals reviewed.              Assessment/Plan:     1. Right hemiparesis (CMS-HCC)  No evidence of hemiparesis at this visit or at her post hospital clinic visit. I recommended she continue on her medications and do lab work before her next visit.  - COMP METABOLIC PANEL; Future  - LIPID PROFILE; Future    2. Other specified transient cerebral ischemias  Possible TIA and she is high risk so she will quit smoking, take her statin and aspirin as long as she has no side effects. She is to report any return of weakness immediately.  - " COMP METABOLIC PANEL; Future  - LIPID PROFILE; Future    3. Tobacco abuse  Patient will go on Chantix. I told her if this does not work she can try nicotine replacement patches but she does need to quit smoking or the risk for heart attack and stroke are high and she ages.  - varenicline (CHANTIX STARTING MONTH CAIN) 0.5 MG X 11 & 1 MG X 42 tablet; Followed by continuing dose pack  Dispense: 56 Tab; Refill: 3    4. Screening for breast cancer    - MA-SCREEN MAMMO W/CAD-BILAT  Patient left before she can get her pneumococcal vaccine. She states she will get a flu shot at work in the next few days.

## 2017-12-30 LAB
ALBUMIN SERPL-MCNC: 4.7 G/DL (ref 3.5–5.5)
ALBUMIN/GLOB SERPL: 1.9 {RATIO} (ref 1.2–2.2)
ALP SERPL-CCNC: 60 IU/L (ref 39–117)
ALT SERPL-CCNC: 27 IU/L (ref 0–32)
AST SERPL-CCNC: 37 IU/L (ref 0–40)
BILIRUB SERPL-MCNC: 0.6 MG/DL (ref 0–1.2)
BUN SERPL-MCNC: 7 MG/DL (ref 6–24)
BUN/CREAT SERPL: 8 (ref 9–23)
CALCIUM SERPL-MCNC: 9.7 MG/DL (ref 8.7–10.2)
CHLORIDE SERPL-SCNC: 98 MMOL/L (ref 96–106)
CHOLEST SERPL-MCNC: 183 MG/DL (ref 100–199)
CO2 SERPL-SCNC: 22 MMOL/L (ref 18–29)
COMMENT 011824: NORMAL
CREAT SERPL-MCNC: 0.9 MG/DL (ref 0.57–1)
GLOBULIN SER CALC-MCNC: 2.5 G/DL (ref 1.5–4.5)
GLUCOSE SERPL-MCNC: 78 MG/DL (ref 65–99)
HDLC SERPL-MCNC: 95 MG/DL
IF AFRICAN AMERICAN  100797: 85 ML/MIN/1.73
IF NON AFRICAN AMER 100791: 74 ML/MIN/1.73
LDLC SERPL CALC-MCNC: 74 MG/DL (ref 0–99)
POTASSIUM SERPL-SCNC: 4.4 MMOL/L (ref 3.5–5.2)
PROT SERPL-MCNC: 7.2 G/DL (ref 6–8.5)
SODIUM SERPL-SCNC: 140 MMOL/L (ref 134–144)
TRIGL SERPL-MCNC: 68 MG/DL (ref 0–149)
VLDLC SERPL CALC-MCNC: 14 MG/DL (ref 5–40)

## 2018-03-12 ENCOUNTER — TELEPHONE (OUTPATIENT)
Dept: MEDICAL GROUP | Facility: MEDICAL CENTER | Age: 53
End: 2018-03-12

## 2018-03-12 DIAGNOSIS — Z72.0 TOBACCO ABUSE: ICD-10-CM

## 2018-03-12 RX ORDER — VARENICLINE TARTRATE 1 MG/1
1 TABLET, FILM COATED ORAL 2 TIMES DAILY
Qty: 60 TAB | Refills: 3 | Status: SHIPPED | OUTPATIENT
Start: 2018-03-12 | End: 2019-01-09

## 2018-03-12 NOTE — TELEPHONE ENCOUNTER
1. Caller Name: [place]                                         Call Back Number: [phone] (home) [phone] (work)        Patient approves a detailed voicemail message: no    patietn called wanting you to ordercontinuation for chantix per pharmacy you order starter kit again

## 2019-01-09 ENCOUNTER — OFFICE VISIT (OUTPATIENT)
Dept: MEDICAL GROUP | Facility: MEDICAL CENTER | Age: 54
End: 2019-01-09
Payer: COMMERCIAL

## 2019-01-09 ENCOUNTER — HOSPITAL ENCOUNTER (OUTPATIENT)
Dept: RADIOLOGY | Facility: MEDICAL CENTER | Age: 54
End: 2019-01-09
Attending: NURSE PRACTITIONER
Payer: COMMERCIAL

## 2019-01-09 VITALS
TEMPERATURE: 97.2 F | SYSTOLIC BLOOD PRESSURE: 102 MMHG | DIASTOLIC BLOOD PRESSURE: 68 MMHG | HEART RATE: 96 BPM | OXYGEN SATURATION: 98 % | WEIGHT: 145 LBS | RESPIRATION RATE: 16 BRPM | BODY MASS INDEX: 24.16 KG/M2 | HEIGHT: 65 IN

## 2019-01-09 DIAGNOSIS — E78.5 DYSLIPIDEMIA: ICD-10-CM

## 2019-01-09 DIAGNOSIS — M79.651 PAIN OF RIGHT THIGH: ICD-10-CM

## 2019-01-09 DIAGNOSIS — Z72.0 TOBACCO ABUSE: ICD-10-CM

## 2019-01-09 DIAGNOSIS — G45.8 OTHER SPECIFIED TRANSIENT CEREBRAL ISCHEMIAS: ICD-10-CM

## 2019-01-09 DIAGNOSIS — G81.91 RIGHT HEMIPARESIS (HCC): ICD-10-CM

## 2019-01-09 DIAGNOSIS — Z00.00 ROUTINE GENERAL MEDICAL EXAMINATION AT A HEALTH CARE FACILITY: ICD-10-CM

## 2019-01-09 PROCEDURE — 99214 OFFICE O/P EST MOD 30 MIN: CPT | Performed by: NURSE PRACTITIONER

## 2019-01-09 PROCEDURE — 93971 EXTREMITY STUDY: CPT | Mod: RT

## 2019-01-09 RX ORDER — ATORVASTATIN CALCIUM 20 MG/1
20 TABLET, FILM COATED ORAL DAILY
Qty: 30 TAB | Refills: 11
Start: 2019-01-09 | End: 2019-07-24 | Stop reason: SDUPTHER

## 2019-01-09 RX ORDER — TIZANIDINE 4 MG/1
4 TABLET ORAL EVERY 6 HOURS PRN
Qty: 30 TAB | Refills: 3 | Status: SHIPPED | OUTPATIENT
Start: 2019-01-09 | End: 2019-07-21

## 2019-01-09 ASSESSMENT — ENCOUNTER SYMPTOMS
NERVOUS/ANXIOUS: 1
MYALGIAS: 1

## 2019-01-09 ASSESSMENT — PATIENT HEALTH QUESTIONNAIRE - PHQ9: CLINICAL INTERPRETATION OF PHQ2 SCORE: 0

## 2019-01-09 NOTE — PROGRESS NOTES
Subjective:      Lakshmi Tanner is a 53 y.o. female who presents with Leg Pain (right one x 2 weeks)        CC: Patient is here today mainly for right upper leg concerns.  She also needs follow-up for her dyslipidemia.    HPI Lakshmi Tanner      1. Pain of right thigh  Patient reports symptoms started with pain in her right upper leg mostly on the lateral aspect about 2 weeks ago.  Patient is a long-term tobacco user and states that she sits for over 8 hours every day for her job.  Symptoms worsened when she went on a plane trip to [place] and back.  Since then she has been having worsening pain which she describes sometimes as burning and sometimes is throbbing.  Nothing makes it better or worse.  She denies associated cough, hemoptysis, shortness of breath headache chest pain, nausea or vomiting.  She is anxious that she may have a DVT.  She has no prior history of DVT.    2. Right hemiparesis (HCC)  Patient was seen for her initial visit in October 2017 after hospital follow-up for right hemiparesis.  Workup was negative but she was felt she was high risk for stroke and was started on baby aspirin and statin which she admits she has not been using.    3. Other specified transient cerebral ischemias  Patient was felt she might have a TIA as mentioned above but stopped her statin because she states it caused GI upset and has not been taking her baby aspirin and continues to smoke cigarettes.  She has not been in for follow-up were done follow-up lab work.    4. Dyslipidemia  Patient was supposed to be on Lipitor 40 mg but has not been using it.    5. Tobacco abuse  Patient continues to smoke up to 2 packs of cigarettes a day although she states she has been decreasing down to 1 pack or less.    6. Routine general medical examination at a health care facility  Patient overdue for routine blood work.  Social History   Substance Use Topics   • Smoking status: Current Every Day Smoker     Packs/day: 2.00     Years:  "40.00     Types: Cigarettes   • Smokeless tobacco: Never Used   • Alcohol use 1.2 oz/week     2 Shots of liquor per week      Comment: 14 shots of Rum a week     Current Outpatient Prescriptions   Medication Sig Dispense Refill   • atorvastatin (LIPITOR) 20 MG Tab Take 1 Tab by mouth every day. 30 Tab 11   • aspirin EC (ECOTRIN) 81 MG Tablet Delayed Response Take 1 Tab by mouth every day. 365 Tab 0     No current facility-administered medications for this visit.      Family History   Problem Relation Age of Onset   • Hypertension Father    • Cancer Paternal Aunt         breast   • Cancer Paternal Uncle         prostate   • Cancer Paternal Uncle         bladder   • Diabetes Neg Hx    • Heart Disease Neg Hx    • Hyperlipidemia Neg Hx      Past Medical History:   Diagnosis Date   • Anemia        Review of Systems   Musculoskeletal: Positive for myalgias.   Psychiatric/Behavioral: The patient is nervous/anxious.    All other systems reviewed and are negative.         Objective:     /68 (BP Location: Right arm, Patient Position: Sitting, BP Cuff Size: Adult)   Pulse 96   Temp 36.2 °C (97.2 °F) (Temporal)   Resp 16   Ht 1.651 m (5' 5\")   Wt 65.8 kg (145 lb)   SpO2 98%   BMI 24.13 kg/m²      Physical Exam   Constitutional: She is oriented to person, place, and time. She appears well-developed and well-nourished. No distress.   HENT:   Head: Normocephalic and atraumatic.   Right Ear: External ear normal.   Left Ear: External ear normal.   Nose: Nose normal.   Eyes: Right eye exhibits no discharge. Left eye exhibits no discharge.   Neck: Normal range of motion. Neck supple. No thyromegaly present.   Cardiovascular: Normal rate, regular rhythm and normal heart sounds.  Exam reveals no gallop and no friction rub.    No murmur heard.  Pulmonary/Chest: Effort normal and breath sounds normal. She has no wheezes. She has no rales.   Musculoskeletal: She exhibits no edema or tenderness.   Mild tenderness to palpation " over the right upper leg on the lateral aspect but there is no redness or swelling.  Pedal pulses are present in the right lower leg feels warm to touch.   Neurological: She is alert and oriented to person, place, and time. She displays normal reflexes.   Skin: Skin is warm and dry. No rash noted. She is not diaphoretic.   Psychiatric: She has a normal mood and affect. Her behavior is normal. Judgment and thought content normal.   Nursing note and vitals reviewed.              Assessment/Plan:     1. Pain of right thigh  Patient at high risk for DVT with her long-term smoking history, prolonged sitting for her job, and recent plane flight so she will be sent for an ultrasound of the affected area and if it is positive, we will go on anticoagulation.  If it is negative I explained this could be musculoskeletal or pain radiating from the back although she states she is not having back pain.  She has been sitting quite a bit which may have precipitated muscular skeletal pain.  There is no rash to suggest shingles at this point.  I advised her that if her DVT study was negative she needs to follow-up in the next week so we can look at other issues and deal with her other chronic problems  - US-EXTREMITY VENOUS UPPER UNILAT RIGHT; Future    2. Right hemiparesis (HCC)  Patient reports no further hemiparesis but has not done any lab work in over a year which needs to be completed.  - COMP METABOLIC PANEL; Future  - TSH; Future  - CBC WITHOUT DIFFERENTIAL; Future    3. Other specified transient cerebral ischemias  She reports no further TIA symptoms but was strongly encouraged to get back on her statin and aspirin.    4. Dyslipidemia  Told patient that if she was getting GI upset from her statin, she can try taking half a tablet of the 40 mg and taking with food.  - atorvastatin (LIPITOR) 20 MG Tab; Take 1 Tab by mouth every day.  Dispense: 30 Tab; Refill: 11  - Lipid Profile; Future    5. Tobacco abuse  Patient again  strongly encouraged to quit smoking because of her history.    6. Routine general medical examination at a health care facility  Patient reminded she should come in yearly and do lab work before each visit.  - COMP METABOLIC PANEL; Future  - Lipid Profile; Future  - TSH; Future  - CBC WITHOUT DIFFERENTIAL; Future

## 2019-01-11 LAB
ALBUMIN SERPL-MCNC: 4.9 G/DL (ref 3.5–5.5)
ALBUMIN/GLOB SERPL: 2 {RATIO} (ref 1.2–2.2)
ALP SERPL-CCNC: 48 IU/L (ref 39–117)
ALT SERPL-CCNC: 24 IU/L (ref 0–32)
AST SERPL-CCNC: 28 IU/L (ref 0–40)
BILIRUB SERPL-MCNC: 0.4 MG/DL (ref 0–1.2)
BUN SERPL-MCNC: 8 MG/DL (ref 6–24)
BUN/CREAT SERPL: 9 (ref 9–23)
CALCIUM SERPL-MCNC: 10.3 MG/DL (ref 8.7–10.2)
CHLORIDE SERPL-SCNC: 104 MMOL/L (ref 96–106)
CHOLEST SERPL-MCNC: 238 MG/DL (ref 100–199)
CO2 SERPL-SCNC: 22 MMOL/L (ref 20–29)
CREAT SERPL-MCNC: 0.87 MG/DL (ref 0.57–1)
ERYTHROCYTE [DISTWIDTH] IN BLOOD BY AUTOMATED COUNT: 13.5 % (ref 12.3–15.4)
GLOBULIN SER CALC-MCNC: 2.4 G/DL (ref 1.5–4.5)
GLUCOSE SERPL-MCNC: 82 MG/DL (ref 65–99)
HCT VFR BLD AUTO: 44.1 % (ref 34–46.6)
HDLC SERPL-MCNC: 106 MG/DL
HGB BLD-MCNC: 15.7 G/DL (ref 11.1–15.9)
IF AFRICAN AMERICAN  100797: 88 ML/MIN/1.73
IF NON AFRICAN AMER 100791: 76 ML/MIN/1.73
LABORATORY COMMENT REPORT: ABNORMAL
LDLC SERPL CALC-MCNC: 105 MG/DL (ref 0–99)
MCH RBC QN AUTO: 33.4 PG (ref 26.6–33)
MCHC RBC AUTO-ENTMCNC: 35.6 G/DL (ref 31.5–35.7)
MCV RBC AUTO: 94 FL (ref 79–97)
NRBC BLD AUTO-RTO: ABNORMAL %
PLATELET # BLD AUTO: 184 X10E3/UL (ref 150–379)
POTASSIUM SERPL-SCNC: 4.5 MMOL/L (ref 3.5–5.2)
PROT SERPL-MCNC: 7.3 G/DL (ref 6–8.5)
RBC # BLD AUTO: 4.7 X10E6/UL (ref 3.77–5.28)
SODIUM SERPL-SCNC: 143 MMOL/L (ref 134–144)
TRIGL SERPL-MCNC: 133 MG/DL (ref 0–149)
TSH SERPL DL<=0.005 MIU/L-ACNC: 4.92 UIU/ML (ref 0.45–4.5)
VLDLC SERPL CALC-MCNC: 27 MG/DL (ref 5–40)
WBC # BLD AUTO: 6.1 X10E3/UL (ref 3.4–10.8)

## 2019-07-21 ENCOUNTER — HOSPITAL ENCOUNTER (EMERGENCY)
Facility: MEDICAL CENTER | Age: 54
End: 2019-07-21
Attending: EMERGENCY MEDICINE
Payer: COMMERCIAL

## 2019-07-21 ENCOUNTER — APPOINTMENT (OUTPATIENT)
Dept: RADIOLOGY | Facility: MEDICAL CENTER | Age: 54
End: 2019-07-21
Attending: EMERGENCY MEDICINE
Payer: COMMERCIAL

## 2019-07-21 VITALS
BODY MASS INDEX: 24.66 KG/M2 | DIASTOLIC BLOOD PRESSURE: 83 MMHG | SYSTOLIC BLOOD PRESSURE: 114 MMHG | OXYGEN SATURATION: 99 % | HEIGHT: 65 IN | TEMPERATURE: 97.8 F | HEART RATE: 85 BPM | WEIGHT: 148 LBS | RESPIRATION RATE: 16 BRPM
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
## 2019-07-21 DIAGNOSIS — R07.9 CHEST PAIN, UNSPECIFIED TYPE: ICD-10-CM

## 2019-07-21 DIAGNOSIS — R07.89 CHEST WALL PAIN: ICD-10-CM

## 2019-07-21 PROCEDURE — 700102 HCHG RX REV CODE 250 W/ 637 OVERRIDE(OP): Performed by: EMERGENCY MEDICINE

## 2019-07-21 PROCEDURE — 99284 EMERGENCY DEPT VISIT MOD MDM: CPT

## 2019-07-21 PROCEDURE — 96372 THER/PROPH/DIAG INJ SC/IM: CPT

## 2019-07-21 PROCEDURE — 71046 X-RAY EXAM CHEST 2 VIEWS: CPT

## 2019-07-21 PROCEDURE — 700111 HCHG RX REV CODE 636 W/ 250 OVERRIDE (IP): Performed by: EMERGENCY MEDICINE

## 2019-07-21 PROCEDURE — A9270 NON-COVERED ITEM OR SERVICE: HCPCS | Performed by: EMERGENCY MEDICINE

## 2019-07-21 RX ORDER — ACETAMINOPHEN 325 MG/1
650 TABLET ORAL EVERY 6 HOURS PRN
Qty: 30 TAB | Refills: 0 | Status: SHIPPED | OUTPATIENT
Start: 2019-07-21 | End: 2019-07-24

## 2019-07-21 RX ORDER — ACETAMINOPHEN 500 MG
1000 TABLET ORAL ONCE
Status: COMPLETED | OUTPATIENT
Start: 2019-07-21 | End: 2019-07-21

## 2019-07-21 RX ORDER — METHOCARBAMOL 750 MG/1
750 TABLET, FILM COATED ORAL ONCE
Status: COMPLETED | OUTPATIENT
Start: 2019-07-21 | End: 2019-07-21

## 2019-07-21 RX ORDER — KETOROLAC TROMETHAMINE 30 MG/ML
15 INJECTION, SOLUTION INTRAMUSCULAR; INTRAVENOUS ONCE
Status: COMPLETED | OUTPATIENT
Start: 2019-07-21 | End: 2019-07-21

## 2019-07-21 RX ORDER — IBUPROFEN 600 MG/1
600 TABLET ORAL EVERY 8 HOURS PRN
Qty: 30 TAB | Refills: 0 | Status: SHIPPED | OUTPATIENT
Start: 2019-07-21 | End: 2019-07-24

## 2019-07-21 RX ORDER — METHOCARBAMOL 750 MG/1
750 TABLET, FILM COATED ORAL 4 TIMES DAILY
Qty: 40 TAB | Refills: 0 | Status: SHIPPED | OUTPATIENT
Start: 2019-07-21 | End: 2019-07-31

## 2019-07-21 RX ADMIN — METHOCARBAMOL TABLETS 750 MG: 750 TABLET, COATED ORAL at 06:58

## 2019-07-21 RX ADMIN — ACETAMINOPHEN 1000 MG: 500 TABLET ORAL at 06:58

## 2019-07-21 RX ADMIN — KETOROLAC TROMETHAMINE 15 MG: 30 INJECTION, SOLUTION INTRAMUSCULAR; INTRAVENOUS at 06:59

## 2019-07-21 ASSESSMENT — PAIN DESCRIPTION - DESCRIPTORS: DESCRIPTORS: ACHING;STABBING

## 2019-07-21 NOTE — ED NOTES
D/C instructions reviewed with pt. Pt states understanding and need for follow-up. Rx in hand with work-note. Pt left ambulatory with a steady gait. Spouse will drive home.

## 2019-07-21 NOTE — ED PROVIDER NOTES
ED Provider Note    Scribed for Skip Morton M.D. by Janes Ortiz. 7/21/2019  6:15 AM    CHIEF COMPLAINT  Chief Complaint   Patient presents with   • Fall   • Rib Pain     R sided       HPI  Lakshmi Tanner is a 54 y.o. female who presents with complaints of right sided rib pain that started last night after a fall at 11PM. The patient states that she was in her bathroom when she slipped and fell, hitting her right ribs on the toilet. The patient denies hitting her head during the incident. She also denies any loss of consciousness before, during, or after falling. The patient associates shortness of breath (secondary to rib pain). She denies abdominal pain. The patient states that she took 800 mg of ibuprofen at home to treat her pain, but it did little to help. She denies any known medical allergies. The patient denies taking any medications on a regular basis. She also denies any previous medical fractures.    REVIEW OF SYSTEMS  Constitutional: No fevers, chills, or recent illness.  Skin: No rashes or diaphoresis.  Eyes: No change in vision, no discharge.  ENT: No hearing change. No rhinorrhea or nasal congestion, no ST or difficulty swallowing.  Respiratory: As above. No hemoptysis. No Wheezing.  Cardiac: No CP, palpitations, edema. No PND or orthopnea.  GI: No Abdominal Pain; N/V; diarrhea, constipation. No blood in stool.  : No dysuria. No D/C. No frequency or urgency. No hesitancy.  MSK: No pain in joints or muscles. No calf pain or swelling. Associates right sided chest wall tenderness  Neuro: No HA or paresthesias. No focal weakness.  Psych: No SI, HI, AH, VH.  Endocrine: No polyuria or polydipsia. No heat or cold intolerance.  Heme: No easy bruising. No history of bleeding disorders or anemia.  See HPI for further details. All other systems are negative.       PAST MEDICAL HISTORY   has a past medical history of  "Anemia.    SOCIAL HISTORY  Social History     Social History Main Topics   • Smoking status: Current Every Day Smoker     Packs/day: 1.00     Years: 40.00     Types: Cigarettes   • Smokeless tobacco: Never Used   • Alcohol use 1.2 oz/week     2 Shots of liquor per week      Comment: 5drinks a day   • Drug use: No   • Sexual activity: Not Currently     Partners: Male       SURGICAL HISTORY   has a past surgical history that includes abdominal hysterectomy total; bladder sling female; and ankle orif.    CURRENT MEDICATIONS  Home Medications    **Home medications have not yet been reviewed for this encounter**         ALLERGIES  No Known Allergies    PHYSICAL EXAM  VITAL SIGNS: /83   Pulse 85   Temp 36.6 °C (97.8 °F) (Temporal)   Resp 16   Ht 1.651 m (5' 5\")   Wt 67.1 kg (148 lb)   SpO2 99%   BMI 24.63 kg/m²  @KELSEY[[id number]::@   Pulse ox interpretation: I interpret this pulse ox as normal.  Genl: 54 year old female sitting in chair comfortably, speaking clearly, appears in moderate distress  Head: NC/AT  ENT: Mucous membranes moist, posterior pharynx clear, uvula midline, nares patent bilaterally  Eyes: Normal sclera, pupils equal round reactive to light  Neck: Supple, FROM, no LAD appreciated  Pulmonary: Lungs are clear to auscultation bilaterally  Chest: Tenderness to palpation is appreciated on the chest wall, right-sided, extending from the fifth through 12th ribs  CV:  RRR, no murmur appreciated, pulses 2+ in both upper and lower extremities,  Abdomen: soft, NT/ND; no rebound/guarding, no masses palpated, no HSM  : no CVA or suprapubic tenderness  Musculoskeletal: Pain free ROM of the neck. Moving upper and lower extremities and spontaneous in coordinated fashion Chest wall has diffuse tenderness on right lateral area with extension into the right upper quadrant no valencia's sign, pain with deep inspiration.  Neuro: A&Ox4 (person, place, time, situation), speech fluent, gait steady, no focal deficits " appreciated, No cerebellar signs Sensation is grossly intact in the distal upper and lower extremities  5/5 strength in  and dorsiflexion/plantar flexion of the ankles  Psych: Patient has an appropriate affect and behavior  Skin: No rash or lesions.  No pallor or jaundice.  No cyanosis. Warm and dry.    DIAGNOSTIC STUDIES / PROCEDURES    RADIOLOGY  DX-CHEST-2 VIEWS   Final Result      Normal chest.               INTERPRETING LOCATION: [address] [place] [address], [facility]        COURSE & MEDICAL DECISION MAKING  Pertinent Labs & Imaging studies reviewed. (See chart for details)    Differential diagnoses include but not limited to: Rib fracture vs muscle contusion vs non displaced rib fracture vs pneumothorax vs hemothorax    6:15 AM - Patient seen and examined at bedside. Patient will be treated with Robaxin 750 mg PO, Toradol 15 mg IV, Tylenol 1,000 mg PO. Ordered DX-Chest to evaluate her symptoms.     Medical Decision Making:   Following initial evaluation the patient noted to have substantial pain and discomfort along the right side of her chest wall.  This is consistent with her mechanism and suggests a rib fracture or possible pneumothorax/hemothorax.  Two-view chest x-rays obtained and demonstrates no appreciable displaced rib fracture, no evidence of pneumothorax or other acute cardia pulmonary disease.  Incentive spirometry teaching is performed here in the emergency department and she receives both Tylenol, ibuprofen, Robaxin.  Following these medication administrations the patient feels slightly improved and is given extensive counseling regarding the likelihood of a nondisplaced rib fracture.  This can be painful for a prolonged period of time and can be associated with increased rates of pneumonia if she has continued splinting and decreased respiratory effort.  She is encouraged to use the incentive spirometer and medications as scheduled and will follow up with her outpatient provider in the coming week.   She may also return to the emergency department for worsening shortness of breath, inability to control her pain with the provided medications, or any other acute developing symptoms.  Questions are addressed and she is discharged home in stable condition.    The patient will not drink alcohol nor drive with prescribed medications. The patient will return for worsening symptoms and is stable at the time of discharge. The patient verbalizes understanding and will comply.       FINAL IMPRESSION  Visit Diagnoses     ICD-10-CM   1. Chest wall pain R07.89   2. Chest pain, unspecified type R07.9   3. Fall from ground level W18.30XA       Janes PADILLA (Scribpatrick), am scribing for, and in the presence of, Skip Morton M.D..    Electronically signed by: Janes Ortiz (Vangie), 7/21/2019    Skip PADILLA M.D. personally performed the services described in this documentation, as scribed by Janes Ortiz in my presence, and it is both accurate and complete.    The note accurately reflects work and decisions made by me.  Skip Morton  7/21/2019  6:58 AM

## 2019-07-21 NOTE — ED TRIAGE NOTES
"Chief Complaint   Patient presents with   • Fall   • Rib Pain     R sided     Pt wheeled to triage for above. Pt slipped in her bathroom last night and fell straight onto the toilet on her ribs on the R side. Pt appears uncomfortable. Pt denies hitting anything else in the fall and denies pain anywhere else. Pt took 800 IBU at home with no relief last night.   /83   Pulse 85   Temp 36.6 °C (97.8 °F) (Temporal)   Resp 16   Ht 1.651 m (5' 5\")   Wt 67.1 kg (148 lb)   SpO2 99%   BMI 24.63 kg/m²     "

## 2019-07-24 ENCOUNTER — OFFICE VISIT (OUTPATIENT)
Dept: MEDICAL GROUP | Facility: MEDICAL CENTER | Age: 54
End: 2019-07-24
Payer: COMMERCIAL

## 2019-07-24 VITALS
BODY MASS INDEX: 24.83 KG/M2 | RESPIRATION RATE: 16 BRPM | SYSTOLIC BLOOD PRESSURE: 126 MMHG | TEMPERATURE: 97.8 F | OXYGEN SATURATION: 99 % | DIASTOLIC BLOOD PRESSURE: 72 MMHG | HEIGHT: 65 IN | WEIGHT: 149 LBS | HEART RATE: 86 BPM

## 2019-07-24 DIAGNOSIS — E83.52 HYPERCALCEMIA: ICD-10-CM

## 2019-07-24 DIAGNOSIS — Z12.39 SCREENING FOR BREAST CANCER: ICD-10-CM

## 2019-07-24 DIAGNOSIS — R79.89 ELEVATED TSH: ICD-10-CM

## 2019-07-24 DIAGNOSIS — Z23 NEED FOR PNEUMOCOCCAL VACCINATION: ICD-10-CM

## 2019-07-24 DIAGNOSIS — E78.5 DYSLIPIDEMIA: ICD-10-CM

## 2019-07-24 DIAGNOSIS — R07.89 CHEST WALL PAIN: ICD-10-CM

## 2019-07-24 PROCEDURE — 99214 OFFICE O/P EST MOD 30 MIN: CPT | Mod: 25 | Performed by: NURSE PRACTITIONER

## 2019-07-24 PROCEDURE — 90732 PPSV23 VACC 2 YRS+ SUBQ/IM: CPT | Performed by: NURSE PRACTITIONER

## 2019-07-24 PROCEDURE — 90471 IMMUNIZATION ADMIN: CPT | Performed by: NURSE PRACTITIONER

## 2019-07-24 RX ORDER — ATORVASTATIN CALCIUM 20 MG/1
20 TABLET, FILM COATED ORAL DAILY
Qty: 90 TAB | Refills: 3 | Status: SHIPPED | OUTPATIENT
Start: 2019-07-24 | End: 2020-09-07

## 2019-07-24 RX ORDER — HYDROCODONE BITARTRATE AND ACETAMINOPHEN 5; 325 MG/1; MG/1
1 TABLET ORAL EVERY 8 HOURS PRN
Qty: 21 TAB | Refills: 0 | Status: SHIPPED | OUTPATIENT
Start: 2019-07-24 | End: 2019-07-31

## 2019-07-24 ASSESSMENT — ENCOUNTER SYMPTOMS: MYALGIAS: 1

## 2019-07-24 NOTE — PROGRESS NOTES
Subjective:      Lakshmi Tanner is a 54 y.o. female who presents with Hospital Follow-up (fractured ribs)        CC: Patient is here today accompanied by her  for follow-up after chest wall injury for which she was seen at the emergency room and she also needs [facility] paperwork.    HPI Lakshmi Tanner        1. Chest wall pain  Patient went to the emergency room on July 21 after slipping and falling at home.  She had pain in the right ribs and chest region.  Her chest x-ray came back showing no evidence of fracture or pneumonia.  She states that she still has some pain in this area, mainly with coughing or deep breathing.  She reports she has been using her incentive spirometer but the Tylenol, ibuprofen and muscle relaxer has not been helping for pain.  Symptoms are worse in the evening.    2. Dyslipidemia  Patient admits she has not been using her statin because she forgets to take and  medications.    3. Elevated TSH  Patient was sent a Fancy message in January after her lab work came back showing elevated TSH at 4.9.  Unfortunately, patient states she has not been checking her messages.    4. Hypercalcemia  Patient's lab work in January also showed mild elevation in calcium at 10.3 with otherwise normal chemistry panel and CBC.  She reports she does not take calcium.    5. Need for pneumococcal vaccination  Patient due for vaccine because she is a tobacco user.    6. Screening for breast cancer  Patient due for yearly mammogram.  Current Outpatient Prescriptions   Medication Sig Dispense Refill   • HYDROcodone-acetaminophen (NORCO) 5-325 MG Tab per tablet Take 1 Tab by mouth every 8 hours as needed for up to 7 days. 21 Tab 0   • atorvastatin (LIPITOR) 20 MG Tab Take 1 Tab by mouth every day. 90 Tab 3   • methocarbamol (ROBAXIN) 750 MG Tab Take 1 Tab by mouth 4 times a day for 10 days. 40 Tab 0   • aspirin EC (ECOTRIN) 81 MG Tablet Delayed Response Take 1 Tab by mouth every day. 365 Tab 0     No  "current facility-administered medications for this visit.      Social History   Substance Use Topics   • Smoking status: Current Every Day Smoker     Packs/day: 1.00     Years: 40.00     Types: Cigarettes   • Smokeless tobacco: Never Used   • Alcohol use 1.2 oz/week     2 Shots of liquor per week      Comment: 5drinks a day     Family History   Problem Relation Age of Onset   • Hypertension Father    • Cancer Paternal Aunt         breast   • Cancer Paternal Uncle         prostate   • Cancer Paternal Uncle         bladder   • Diabetes Neg Hx    • Heart Disease Neg Hx    • Hyperlipidemia Neg Hx      Past Medical History:   Diagnosis Date   • Anemia        Review of Systems   Musculoskeletal: Positive for myalgias.   All other systems reviewed and are negative.         Objective:     /72 (BP Location: Left arm, Patient Position: Sitting, BP Cuff Size: Adult)   Pulse 86   Temp 36.6 °C (97.8 °F) (Temporal)   Resp 16   Ht 1.651 m (5' 5\")   Wt 67.6 kg (149 lb)   SpO2 99%   BMI 24.79 kg/m²      Physical Exam   Constitutional: She is oriented to person, place, and time. She appears well-developed and well-nourished. No distress.   HENT:   Head: Normocephalic and atraumatic.   Right Ear: External ear normal.   Left Ear: External ear normal.   Nose: Nose normal.   Eyes: Right eye exhibits no discharge. Left eye exhibits no discharge.   Neck: Normal range of motion. Neck supple. No thyromegaly present.   Cardiovascular: Normal rate, regular rhythm and normal heart sounds.  Exam reveals no gallop and no friction rub.    No murmur heard.  Pulmonary/Chest: Effort normal and breath sounds normal. She has no wheezes. She has no rales.   Lungs sound clear and chest x-ray done 3 days ago was normal.   Musculoskeletal: She exhibits no edema or tenderness.   Tenderness over the right rib area in the upper portion of the chest.   Neurological: She is alert and oriented to person, place, and time. She displays normal " reflexes.   Skin: Skin is warm and dry. No rash noted. She is not diaphoretic.   Psychiatric: She has a normal mood and affect. Her behavior is normal. Judgment and thought content normal.   Nursing note and vitals reviewed.              Assessment/Plan:     1. Chest wall pain  Patient very uncomfortable with her chest injury and Tylenol, Motrin or muscle relaxers are not working well, especially at night when she tries to sleep.  Her opiate risk assessment showed low risk for addiction and after signing a contract today, a short course of Norco was provided to use mainly in the evening.  She may use Tylenol or Motrin when she needs to be alert.  FMLA paperwork was filled out for her to be off work because of the injury.  I told her if she should develop any trouble with breathing or worsening symptoms, will need to go back to the ER.  - HYDROcodone-acetaminophen (NORCO) 5-325 MG Tab per tablet; Take 1 Tab by mouth every 8 hours as needed for up to 7 days.  Dispense: 21 Tab; Refill: 0  - Consent for Opiate Prescription    2. Dyslipidemia  Patient reminded to take her medication and I will refill this today and then do lab work in the next 2 months.  - atorvastatin (LIPITOR) 20 MG Tab; Take 1 Tab by mouth every day.  Dispense: 90 Tab; Refill: 3    3. Elevated TSH  I advised patient that if her TSH is again elevated, I would like her to come back to the office to discuss possibly starting on low-dose levothyroxine.  - TSH+FREE T4    4. Hypercalcemia  Patient will do repeat calcium check and if still elevated, she will need further work-up.  - Comp Metabolic Panel; Future  - CALCIUM IONIZED; Future    5. Need for pneumococcal vaccination  I have placed the below orders and discussed them with an approved delegating provider. The MA is performing the below orders under the direction of Dr. Watkins    - Pneumococal Polysaccharide Vaccine 23-Valent =>1YO SQ/IM    6. Screening for breast cancer    - MA-SCREEN MAMMO  W/CAD-BILAT; Future

## 2019-08-05 LAB
ALBUMIN SERPL-MCNC: 4.7 G/DL (ref 3.5–5.5)
ALBUMIN/GLOB SERPL: 1.8 {RATIO} (ref 1.2–2.2)
ALP SERPL-CCNC: 58 IU/L (ref 39–117)
ALT SERPL-CCNC: 22 IU/L (ref 0–32)
AST SERPL-CCNC: 22 IU/L (ref 0–40)
BILIRUB SERPL-MCNC: 0.4 MG/DL (ref 0–1.2)
BUN SERPL-MCNC: 8 MG/DL (ref 6–24)
BUN/CREAT SERPL: 10 (ref 9–23)
CA-I SERPL ISE-MCNC: 4.9 MG/DL (ref 4.5–5.6)
CALCIUM SERPL-MCNC: 9.6 MG/DL (ref 8.7–10.2)
CHLORIDE SERPL-SCNC: 100 MMOL/L (ref 96–106)
CO2 SERPL-SCNC: 22 MMOL/L (ref 20–29)
CREAT SERPL-MCNC: 0.77 MG/DL (ref 0.57–1)
GLOBULIN SER CALC-MCNC: 2.6 G/DL (ref 1.5–4.5)
GLUCOSE SERPL-MCNC: 91 MG/DL (ref 65–99)
POTASSIUM SERPL-SCNC: 4.6 MMOL/L (ref 3.5–5.2)
PROT SERPL-MCNC: 7.3 G/DL (ref 6–8.5)
SODIUM SERPL-SCNC: 138 MMOL/L (ref 134–144)

## 2023-04-05 NOTE — PROGRESS NOTES
Chief Complaint   Patient presents with   • Numbness     on (R) arm, (R) leg and (R) foot since this morning       HISTORY OF PRESENT ILLNESS: Patient is a 52 y.o. female who presents to urgent care today with complaints of right-sided tingling sensation. States that at approximately 7 AM, when she was driving to work this morning, she noticed tingling and weakness to both her right arm and right leg. She arrived to work and did not notice improvement. Therefore she decided to come to urgent care this morning for further evaluation. She denies associated chest pain, shortness of breath, headache, neck pain, difficulty with speech, drooping, or changes in vision. She does admit to feelings of confusion over the past few months but feels oriented today. She has not taken any medication for symptom relief. Denies recent head injury.She does have a history of smoking, but denies any other significant health history.     Patient Active Problem List    Diagnosis Date Noted   • Surgical menopause 07/22/2015       Allergies:Review of patient's allergies indicates no known allergies.    No current [employer]-ordered outpatient prescriptions on file.     No current [employer]-ordered facility-administered medications on file.        Past Medical History:   Diagnosis Date   • Anemia        Social History   Substance Use Topics   • Smoking status: Current Every Day Smoker     Packs/day: 1.00     Years: 20.00     Types: Cigarettes   • Smokeless tobacco: Never Used   • Alcohol use No       Family Status   Relation Status   • Mother Alive   • Father Alive     Family History   Problem Relation Age of Onset   • Hypertension Father    • Cancer Paternal Aunt      breast   • Cancer Paternal Uncle      prostate   • Diabetes Neg Hx    • Heart Disease Neg Hx    • Hyperlipidemia Neg Hx    • Cancer Paternal Uncle      bladder       ROS:  Review of Systems   Constitutional: Negative for fever, chills, weight loss, malaise, and fatigue.   HENT: Negative  "for ear pain, nosebleeds, congestion, sore throat and neck pain.    Eyes: Negative for vision changes.   Neuro: Positive for weakness and tingling in both right arm and right leg. Negative for headache, droop, difficulty with speech, seizure, LOC.   Cardiovascular: Negative for chest pain, palpitations, orthopnea and leg swelling.   Respiratory: Negative for cough, sputum production, shortness of breath and wheezing.   Gastrointestinal: Negative for abdominal pain, nausea, vomiting or diarrhea.   Genitourinary: Negative for dysuria, urgency and frequency.  Musculoskeletal: Negative for falls, neck pain, back pain, joint pain, myalgias.   Skin: Negative for rash, diaphoresis.     Exam:  Blood pressure 126/72, pulse 89, temperature 36.6 °C (97.9 °F), resp. rate 14, height 1.651 m (5' 5\"), weight 60.8 kg (134 lb), SpO2 96 %.  General: well-nourished, well-developed female in NAD  Head: normocephalic, atraumatic  Eyes: PERRLA, no conjunctival injection, acuity grossly intact, lids normal.  Ears: normal shape and symmetry, no tenderness, no discharge. External canals are without any significant edema or erythema. Tympanic membranes are without any inflammation, no effusion. Gross auditory acuity is intact.  Nose: symmetrical without tenderness, no discharge.  Mouth/Throat: reasonable hygiene, no erythema, exudates or tonsillar enlargement.  Neck: no masses, range of motion within normal limits, no tracheal deviation. No obvious thyroid enlargement.   Lymph: no cervical adenopathy. No supraclavicular adenopathy.   Neuro: alert and oriented. Cranial nerves 1-12 grossly intact. No sensory deficit. Strength equal, 5/5. No droop. Bilateral patellar DTRs equal +2. Gait is equal.  Cardiovascular: regular rate and rhythm. No edema.  Pulmonary: no distress. Chest is symmetrical with respiration, no wheezes, crackles, or rhonchi.   Musculoskeletal: no clubbing, appropriate muscle tone, gait is stable.  Skin: warm, dry, intact, no " clubbing, no cyanosis, no rashes.   Psych: appropriate mood, affect, judgement.         Assessment/Plan:  1. Numbness and tingling of right upper and lower extremity  UC AMA/Refusal of Treatment           The patient comes into the clinic today with sudden onset of right-sided tingling and weakness to her right upper and lower extremities at 7 AM. Her neurological exam is normal in clinic today, nevertheless I feel the patient requires a higher level of care including closer monitoring, stat lab work and/or imaging for further evaluation of CVA symptoms. This has been discussed with the patient and they state agreement and understanding. The patient would like to go to [facility] ED. I discussed with her that I recommend EMS transport at this time. However the patient is deciding against medical advice and states she will walk across the street to the emergency department. I discussed with the patient the risks of deciding against receiving appropriate care to include death. The patient is not intoxicated. The patient is a capable decision maker and understands the risks and benefits of her decision. While I certainly disagree with the patient's decision, I respect the patient's autonomy and will not keep her here against her will. She understand that her decision to decline further care can be reversed at any time. I have asked the patient to sign an AMA form and MA to witness this signature. Report has been called to the emergency department, they will be anticipating patient's arrival. She is stable upon departure and will go directly to ED without delay.        Please note that this dictation was created using voice recognition software. I have made every reasonable attempt to correct obvious errors, but I expect that there are errors of grammar and possibly content that I did not discover before finalizing the note.      MALVIN Pierre.      Distance Of Undermining In Cm (Required): 1.5